# Patient Record
Sex: FEMALE | Race: BLACK OR AFRICAN AMERICAN | Employment: UNEMPLOYED | URBAN - METROPOLITAN AREA
[De-identification: names, ages, dates, MRNs, and addresses within clinical notes are randomized per-mention and may not be internally consistent; named-entity substitution may affect disease eponyms.]

---
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
## 2019-04-10 ENCOUNTER — HOSPITAL ENCOUNTER (EMERGENCY)
Age: 19
Discharge: HOME | End: 2019-04-10
Attending: EMERGENCY MEDICINE
Payer: COMMERCIAL

## 2019-04-10 VITALS
TEMPERATURE: 98.2 F | WEIGHT: 143 LBS | HEIGHT: 58 IN | HEART RATE: 68 BPM | SYSTOLIC BLOOD PRESSURE: 113 MMHG | OXYGEN SATURATION: 99 % | RESPIRATION RATE: 16 BRPM | BODY MASS INDEX: 30.02 KG/M2 | DIASTOLIC BLOOD PRESSURE: 92 MMHG

## 2019-04-10 DIAGNOSIS — Z48.89 ENCOUNTER FOR POST SURGICAL WOUND CHECK: Primary | ICD-10-CM

## 2019-04-10 LAB
ABSOLUTE BANDS #: 0.26 K/UL (ref 0–1)
ABSOLUTE EOS #: 0.09 K/UL (ref 0–0.4)
ABSOLUTE IMMATURE GRANULOCYTE: ABNORMAL K/UL (ref 0–0.3)
ABSOLUTE LYMPH #: 1.62 K/UL (ref 1.2–5.2)
ABSOLUTE MONO #: 1.19 K/UL (ref 0.1–1.3)
ALBUMIN SERPL-MCNC: 3.5 G/DL (ref 3.5–5.2)
ALBUMIN/GLOBULIN RATIO: ABNORMAL (ref 1–2.5)
ALP BLD-CCNC: 187 U/L (ref 35–104)
ALT SERPL-CCNC: 23 U/L (ref 5–33)
ANION GAP SERPL CALCULATED.3IONS-SCNC: 12 MMOL/L (ref 9–17)
AST SERPL-CCNC: 25 U/L
BANDS: 3 % (ref 0–10)
BASOPHILS # BLD: 0 % (ref 0–2)
BASOPHILS ABSOLUTE: 0 K/UL (ref 0–0.2)
BILIRUB SERPL-MCNC: 0.27 MG/DL (ref 0.3–1.2)
BUN BLDV-MCNC: 8 MG/DL (ref 6–20)
BUN/CREAT BLD: ABNORMAL (ref 9–20)
CALCIUM SERPL-MCNC: 8.7 MG/DL (ref 8.6–10.4)
CHLORIDE BLD-SCNC: 106 MMOL/L (ref 98–107)
CO2: 22 MMOL/L (ref 20–31)
CREAT SERPL-MCNC: 0.59 MG/DL (ref 0.5–0.9)
DIFFERENTIAL TYPE: ABNORMAL
EOSINOPHILS RELATIVE PERCENT: 1 % (ref 0–4)
GFR AFRICAN AMERICAN: >60 ML/MIN
GFR NON-AFRICAN AMERICAN: >60 ML/MIN
GFR SERPL CREATININE-BSD FRML MDRD: ABNORMAL ML/MIN/{1.73_M2}
GFR SERPL CREATININE-BSD FRML MDRD: ABNORMAL ML/MIN/{1.73_M2}
GLUCOSE BLD-MCNC: 88 MG/DL (ref 70–99)
HCT VFR BLD CALC: 27.8 % (ref 36–46)
HEMOGLOBIN: 9.1 G/DL (ref 12–16)
IMMATURE GRANULOCYTES: ABNORMAL %
INR BLD: 1.1
LYMPHOCYTES # BLD: 19 % (ref 25–45)
MCH RBC QN AUTO: 29.3 PG (ref 26–34)
MCHC RBC AUTO-ENTMCNC: 32.7 G/DL (ref 31–37)
MCV RBC AUTO: 89.4 FL (ref 80–100)
MONOCYTES # BLD: 14 % (ref 2–8)
MORPHOLOGY: ABNORMAL
MORPHOLOGY: ABNORMAL
NRBC AUTOMATED: ABNORMAL PER 100 WBC
PDW BLD-RTO: 14.6 % (ref 11.5–14.9)
PLATELET # BLD: 239 K/UL (ref 150–450)
PLATELET ESTIMATE: ABNORMAL
PMV BLD AUTO: 8.2 FL (ref 6–12)
POTASSIUM SERPL-SCNC: 4 MMOL/L (ref 3.7–5.3)
PROTHROMBIN TIME: 13.8 SEC (ref 11.8–14.6)
RBC # BLD: 3.11 M/UL (ref 4–5.2)
RBC # BLD: ABNORMAL 10*6/UL
SEG NEUTROPHILS: 63 % (ref 34–64)
SEGMENTED NEUTROPHILS ABSOLUTE COUNT: 5.34 K/UL (ref 1.3–9.1)
SODIUM BLD-SCNC: 140 MMOL/L (ref 135–144)
TOTAL PROTEIN: 7 G/DL (ref 6.4–8.3)
WBC # BLD: 8.5 K/UL (ref 4.5–13.5)
WBC # BLD: ABNORMAL 10*3/UL

## 2019-04-10 PROCEDURE — 99283 EMERGENCY DEPT VISIT LOW MDM: CPT

## 2019-04-10 PROCEDURE — 85610 PROTHROMBIN TIME: CPT

## 2019-04-10 PROCEDURE — 85025 COMPLETE CBC W/AUTO DIFF WBC: CPT

## 2019-04-10 PROCEDURE — 36415 COLL VENOUS BLD VENIPUNCTURE: CPT

## 2019-04-10 PROCEDURE — 80053 COMPREHEN METABOLIC PANEL: CPT

## 2019-04-10 RX ORDER — IBUPROFEN 800 MG/1
800 TABLET ORAL EVERY 6 HOURS PRN
COMMUNITY

## 2019-04-10 RX ORDER — HYDROCODONE BITARTRATE AND ACETAMINOPHEN 5; 325 MG/1; MG/1
1 TABLET ORAL EVERY 6 HOURS PRN
Status: ON HOLD | COMMUNITY
End: 2019-04-15 | Stop reason: HOSPADM

## 2019-04-10 ASSESSMENT — ENCOUNTER SYMPTOMS
NAUSEA: 0
SHORTNESS OF BREATH: 0
ABDOMINAL PAIN: 1

## 2019-04-10 ASSESSMENT — PAIN DESCRIPTION - DESCRIPTORS: DESCRIPTORS: ACHING

## 2019-04-10 ASSESSMENT — PAIN SCALES - GENERAL: PAINLEVEL_OUTOF10: 7

## 2019-04-10 ASSESSMENT — PAIN DESCRIPTION - ORIENTATION: ORIENTATION: LOWER

## 2019-04-10 ASSESSMENT — PAIN DESCRIPTION - FREQUENCY: FREQUENCY: CONTINUOUS

## 2019-04-10 ASSESSMENT — PAIN DESCRIPTION - LOCATION: LOCATION: ABDOMEN

## 2019-04-10 NOTE — ED NOTES

## 2019-04-10 NOTE — ED PROVIDER NOTES
[address]  eMERGENCY dEPARTMENT eNCOUnter      Pt Name: Sherwin Cassidy  MRN: [id number]  [place] 2000  Date of evaluation: 4/10/19      CHIEF COMPLAINT       Chief Complaint   Patient presents with    Wound Check     HISTORY OF PRESENT ILLNESS   HPI 20 y.o. female presents with c/o of bleeding from  wound. The patient was , who underwent a cs on 4/3/19 at [facility] in [facility] for failure to progress. Her postoperative course has been unremarkable. She states that her pain is improving every day. It is throbbing/sharp, 7 out of 10 intensity, improves with ibuprofen and Norco.  She woke up this morning around 5am and noticed dried blood on her dressing  and had that she keeps over the surgical wound. No fevers or chills. No vaginal discharge. No lightheadedness or dizziness. REVIEW OF SYSTEMS     Review of Systems   Constitutional: Negative for chills and fever. HENT: Negative for congestion. Eyes: Negative for visual disturbance. Respiratory: Negative for shortness of breath. Cardiovascular: Negative for chest pain. Gastrointestinal: Positive for abdominal pain. Negative for nausea. Skin: Positive for wound. Neurological: Negative for dizziness and light-headedness. Hematological: Does not bruise/bleed easily. PAST MEDICAL HISTORY   None    SURGICAL HISTORY     Cs - 4/3/19    CURRENT MEDICATIONS       Previous Medications    DOCUSATE SODIUM (COLACE) 100 MG CAPSULE    Take 1 capsule by mouth daily    HYDROCODONE-ACETAMINOPHEN (NORCO) 5-325 MG PER TABLET    Take 1 tablet by mouth every 6 hours as needed for Pain. IBUPROFEN (ADVIL;MOTRIN) 800 MG TABLET    Take 800 mg by mouth every 6 hours as needed for Pain    PRENATAL VIT-FE FUMARATE-FA (PRENATAL 1+1 PO)    Take by mouth       ALLERGIES     has No Known Allergies. FAMILY HISTORY     MGM - Diabetes    SOCIAL HISTORY      reports that she has never smoked.  She has never used smokeless tobacco. She reports that she does not drink alcohol or use drugs. PHYSICAL EXAM     INITIAL VITALS: BP (!) 113/92   Pulse 68   Temp 98.2 °F (36.8 °C) (Oral)   Resp 16   Ht 4' 10\" (1.473 m)   Wt 143 lb (64.9 kg)   LMP  (LMP Unknown)   SpO2 99%   Breastfeeding? Unknown   BMI 29.89 kg/m²   Gen: NAD  Head: Normocephalic, atraumatic  Eye: Pupils equal round reactive to light, no conjunctivitis  Heart: Regular rate and rhythm no murmurs  Lungs: Clear to auscultation bilaterally, no respiratory distress  Chest wall: No crepitus, no tenderness palpation  Abdomen: Soft, nontender, nondistended, with no peritoneal signs  Skin: lower abdominal transverse surgical scar. No erythema. There is no wound dehiscence. There is dry blood on the Steri-Strips and some dry blood on the pad that she has over the wound. There is no expressible bleeding or discharge. Neurologic: Patient is alert and oriented x3, motor and sensation is intact in all 4 extremities, cerebellar function is normal  Extremities: No edema or rash   MEDICAL DECISION MAKING:     MDM  This is a 79-year-old who is one-week status post a  presenting for wound evaluation. There is no wound dehiscence. There is no active bleeding. There is no sign of infection. We'll check hemoglobin level. We'll check her platelets. We'll check her coags. And reassess. Hospital course:  Hemoglobin is at her baseline. Platelets are normal.  Renal function is normal.  Coags are normal.    There continues to be no bleeding from the surgical site. Blood pressure remains normal.  Heart rate remains normal.    I discussed with the patient about following up with the local OB or returning to her OBs in [facility]. She says that she is going to return to [facility] where she has more family help taking care of the baby and she does not want to follow with an OB in the [place] area.     D/w pt results, treatment plan, warning precautions for prompt ED return and importance

## 2019-04-14 ENCOUNTER — APPOINTMENT (OUTPATIENT)
Dept: CT IMAGING | Age: 19
DRG: 561 | End: 2019-04-14
Payer: COMMERCIAL

## 2019-04-14 ENCOUNTER — HOSPITAL ENCOUNTER (INPATIENT)
Age: 19
LOS: 1 days | Discharge: HOME | DRG: 561 | End: 2019-04-15
Attending: EMERGENCY MEDICINE | Admitting: OBSTETRICS & GYNECOLOGY
Payer: COMMERCIAL

## 2019-04-14 ENCOUNTER — APPOINTMENT (OUTPATIENT)
Dept: GENERAL RADIOLOGY | Age: 19
DRG: 561 | End: 2019-04-14
Payer: COMMERCIAL

## 2019-04-14 DIAGNOSIS — A59.9 TRICHOMONAS INFECTION: ICD-10-CM

## 2019-04-14 DIAGNOSIS — N71.9 ENDOMETRITIS: ICD-10-CM

## 2019-04-14 DIAGNOSIS — A41.9 SEPTICEMIA (HCC): Primary | ICD-10-CM

## 2019-04-14 PROBLEM — E87.20 LACTIC ACIDOSIS: Status: ACTIVE | Noted: 2019-04-14

## 2019-04-14 PROBLEM — D72.829 LEUKOCYTOSIS: Status: ACTIVE | Noted: 2019-04-14

## 2019-04-14 PROBLEM — Z98.891 S/P CESAREAN SECTION: Status: ACTIVE | Noted: 2019-04-14

## 2019-04-14 PROBLEM — N61.0 MASTITIS: Status: ACTIVE | Noted: 2019-04-14

## 2019-04-14 PROBLEM — R50.82 POSTOPERATIVE FEVER: Status: ACTIVE | Noted: 2019-04-14

## 2019-04-14 LAB
-: ABNORMAL
ABSOLUTE EOS #: 0 K/UL (ref 0–0.4)
ABSOLUTE IMMATURE GRANULOCYTE: ABNORMAL K/UL (ref 0–0.3)
ABSOLUTE LYMPH #: 0.76 K/UL (ref 1.2–5.2)
ABSOLUTE MONO #: 0.61 K/UL (ref 0.1–1.3)
ALBUMIN SERPL-MCNC: 3.8 G/DL (ref 3.5–5.2)
ALBUMIN/GLOBULIN RATIO: ABNORMAL (ref 1–2.5)
ALP BLD-CCNC: 161 U/L (ref 35–104)
ALT SERPL-CCNC: 21 U/L (ref 5–33)
AMORPHOUS: ABNORMAL
AMPHETAMINE SCREEN URINE: NEGATIVE
ANION GAP SERPL CALCULATED.3IONS-SCNC: 14 MMOL/L (ref 9–17)
AST SERPL-CCNC: 20 U/L
BACTERIA: ABNORMAL
BARBITURATE SCREEN URINE: NEGATIVE
BASOPHILS # BLD: 0 % (ref 0–2)
BASOPHILS ABSOLUTE: 0 K/UL (ref 0–0.2)
BENZODIAZEPINE SCREEN, URINE: NEGATIVE
BILIRUB SERPL-MCNC: 0.31 MG/DL (ref 0.3–1.2)
BILIRUBIN URINE: NEGATIVE
BUN BLDV-MCNC: 15 MG/DL (ref 6–20)
BUN/CREAT BLD: ABNORMAL (ref 9–20)
BUPRENORPHINE URINE: NORMAL
CALCIUM SERPL-MCNC: 8.7 MG/DL (ref 8.6–10.4)
CANNABINOID SCREEN URINE: NEGATIVE
CASTS UA: ABNORMAL /LPF
CHLORIDE BLD-SCNC: 102 MMOL/L (ref 98–107)
CO2: 21 MMOL/L (ref 20–31)
COCAINE METABOLITE, URINE: NEGATIVE
COLOR: YELLOW
COMMENT UA: ABNORMAL
CREAT SERPL-MCNC: 0.65 MG/DL (ref 0.5–0.9)
CRYSTALS, UA: ABNORMAL /HPF
D-DIMER QUANTITATIVE: 1.21 MG/L FEU (ref 0–0.59)
DIFFERENTIAL TYPE: ABNORMAL
DIRECT EXAM: ABNORMAL
DIRECT EXAM: NORMAL
EOSINOPHILS RELATIVE PERCENT: 0 % (ref 0–4)
EPITHELIAL CELLS UA: ABNORMAL /HPF
GFR AFRICAN AMERICAN: >60 ML/MIN
GFR NON-AFRICAN AMERICAN: >60 ML/MIN
GFR SERPL CREATININE-BSD FRML MDRD: ABNORMAL ML/MIN/{1.73_M2}
GFR SERPL CREATININE-BSD FRML MDRD: ABNORMAL ML/MIN/{1.73_M2}
GLUCOSE BLD-MCNC: 106 MG/DL (ref 70–99)
GLUCOSE URINE: NEGATIVE
HCT VFR BLD CALC: 35.1 % (ref 36–46)
HEMOGLOBIN: 11.3 G/DL (ref 12–16)
IMMATURE GRANULOCYTES: ABNORMAL %
INR BLD: 1.1
KETONES, URINE: NEGATIVE
LACTIC ACID, SEPSIS WHOLE BLOOD: ABNORMAL MMOL/L (ref 0.5–1.9)
LACTIC ACID, SEPSIS WHOLE BLOOD: NORMAL MMOL/L (ref 0.5–1.9)
LACTIC ACID, SEPSIS: 1.5 MMOL/L (ref 0.5–1.9)
LACTIC ACID, SEPSIS: 2.2 MMOL/L (ref 0.5–1.9)
LEUKOCYTE ESTERASE, URINE: ABNORMAL
LYMPHOCYTES # BLD: 5 % (ref 25–45)
Lab: ABNORMAL
Lab: NORMAL
MCH RBC QN AUTO: 28.1 PG (ref 26–34)
MCHC RBC AUTO-ENTMCNC: 32.1 G/DL (ref 31–37)
MCV RBC AUTO: 87.7 FL (ref 80–100)
MDMA URINE: NORMAL
METHADONE SCREEN, URINE: NEGATIVE
METHAMPHETAMINE, URINE: NORMAL
MONOCYTES # BLD: 4 % (ref 2–8)
MORPHOLOGY: ABNORMAL
MUCUS: ABNORMAL
NITRITE, URINE: NEGATIVE
NRBC AUTOMATED: ABNORMAL PER 100 WBC
OPIATES, URINE: NEGATIVE
OTHER OBSERVATIONS UA: ABNORMAL
OXYCODONE SCREEN URINE: NEGATIVE
PARTIAL THROMBOPLASTIN TIME: 33.6 SEC (ref 24–36)
PDW BLD-RTO: 14.8 % (ref 11.5–14.9)
PH UA: 6 (ref 5–8)
PHENCYCLIDINE, URINE: NEGATIVE
PLATELET # BLD: 272 K/UL (ref 150–450)
PLATELET ESTIMATE: ABNORMAL
PMV BLD AUTO: 8.4 FL (ref 6–12)
POTASSIUM SERPL-SCNC: 4.1 MMOL/L (ref 3.7–5.3)
PROPOXYPHENE, URINE: NORMAL
PROTEIN UA: NEGATIVE
PROTHROMBIN TIME: 14.2 SEC (ref 11.8–14.6)
RBC # BLD: 4.01 M/UL (ref 4–5.2)
RBC # BLD: ABNORMAL 10*6/UL
RBC UA: ABNORMAL /HPF
RENAL EPITHELIAL, UA: ABNORMAL /HPF
SEG NEUTROPHILS: 91 % (ref 34–64)
SEGMENTED NEUTROPHILS ABSOLUTE COUNT: 13.83 K/UL (ref 1.3–9.1)
SODIUM BLD-SCNC: 137 MMOL/L (ref 135–144)
SPECIFIC GRAVITY UA: 1 (ref 1–1.03)
SPECIMEN DESCRIPTION: ABNORMAL
SPECIMEN DESCRIPTION: NORMAL
TEST INFORMATION: NORMAL
TOTAL PROTEIN: 7.8 G/DL (ref 6.4–8.3)
TRICHOMONAS: ABNORMAL
TRICYCLIC ANTIDEPRESSANTS, UR: NORMAL
TURBIDITY: ABNORMAL
URINE HGB: ABNORMAL
UROBILINOGEN, URINE: NORMAL
WBC # BLD: 15.2 K/UL (ref 4.5–13.5)
WBC # BLD: ABNORMAL 10*3/UL
WBC UA: ABNORMAL /HPF
YEAST: ABNORMAL

## 2019-04-14 PROCEDURE — 2580000003 HC RX 258: Performed by: STUDENT IN AN ORGANIZED HEALTH CARE EDUCATION/TRAINING PROGRAM

## 2019-04-14 PROCEDURE — 36415 COLL VENOUS BLD VENIPUNCTURE: CPT

## 2019-04-14 PROCEDURE — 83605 ASSAY OF LACTIC ACID: CPT

## 2019-04-14 PROCEDURE — 80307 DRUG TEST PRSMV CHEM ANLYZR: CPT

## 2019-04-14 PROCEDURE — 1200000000 HC SEMI PRIVATE

## 2019-04-14 PROCEDURE — 6360000002 HC RX W HCPCS: Performed by: STUDENT IN AN ORGANIZED HEALTH CARE EDUCATION/TRAINING PROGRAM

## 2019-04-14 PROCEDURE — 85610 PROTHROMBIN TIME: CPT

## 2019-04-14 PROCEDURE — 80053 COMPREHEN METABOLIC PANEL: CPT

## 2019-04-14 PROCEDURE — 99285 EMERGENCY DEPT VISIT HI MDM: CPT

## 2019-04-14 PROCEDURE — 6370000000 HC RX 637 (ALT 250 FOR IP): Performed by: STUDENT IN AN ORGANIZED HEALTH CARE EDUCATION/TRAINING PROGRAM

## 2019-04-14 PROCEDURE — 71046 X-RAY EXAM CHEST 2 VIEWS: CPT

## 2019-04-14 PROCEDURE — 87040 BLOOD CULTURE FOR BACTERIA: CPT

## 2019-04-14 PROCEDURE — 96365 THER/PROPH/DIAG IV INF INIT: CPT

## 2019-04-14 PROCEDURE — 6360000004 HC RX CONTRAST MEDICATION: Performed by: STUDENT IN AN ORGANIZED HEALTH CARE EDUCATION/TRAINING PROGRAM

## 2019-04-14 PROCEDURE — 2580000003 HC RX 258: Performed by: OBSTETRICS & GYNECOLOGY

## 2019-04-14 PROCEDURE — 81001 URINALYSIS AUTO W/SCOPE: CPT

## 2019-04-14 PROCEDURE — 87491 CHLMYD TRACH DNA AMP PROBE: CPT

## 2019-04-14 PROCEDURE — 87480 CANDIDA DNA DIR PROBE: CPT

## 2019-04-14 PROCEDURE — 96375 TX/PRO/DX INJ NEW DRUG ADDON: CPT

## 2019-04-14 PROCEDURE — 85025 COMPLETE CBC W/AUTO DIFF WBC: CPT

## 2019-04-14 PROCEDURE — 96366 THER/PROPH/DIAG IV INF ADDON: CPT

## 2019-04-14 PROCEDURE — 96367 TX/PROPH/DG ADDL SEQ IV INF: CPT

## 2019-04-14 PROCEDURE — 87086 URINE CULTURE/COLONY COUNT: CPT

## 2019-04-14 PROCEDURE — 87804 INFLUENZA ASSAY W/OPTIC: CPT

## 2019-04-14 PROCEDURE — 87510 GARDNER VAG DNA DIR PROBE: CPT

## 2019-04-14 PROCEDURE — 74177 CT ABD & PELVIS W/CONTRAST: CPT

## 2019-04-14 PROCEDURE — 85730 THROMBOPLASTIN TIME PARTIAL: CPT

## 2019-04-14 PROCEDURE — 85379 FIBRIN DEGRADATION QUANT: CPT

## 2019-04-14 PROCEDURE — 71260 CT THORAX DX C+: CPT

## 2019-04-14 PROCEDURE — 87591 N.GONORRHOEAE DNA AMP PROB: CPT

## 2019-04-14 PROCEDURE — 87660 TRICHOMONAS VAGIN DIR PROBE: CPT

## 2019-04-14 RX ORDER — ACETAMINOPHEN 325 MG/1
650 TABLET ORAL EVERY 4 HOURS PRN
Status: DISCONTINUED | OUTPATIENT
Start: 2019-04-14 | End: 2019-04-14

## 2019-04-14 RX ORDER — SENNA AND DOCUSATE SODIUM 50; 8.6 MG/1; MG/1
1 TABLET, FILM COATED ORAL 2 TIMES DAILY
Status: DISCONTINUED | OUTPATIENT
Start: 2019-04-15 | End: 2019-04-15 | Stop reason: HOSPADM

## 2019-04-14 RX ORDER — 0.9 % SODIUM CHLORIDE 0.9 %
80 INTRAVENOUS SOLUTION INTRAVENOUS ONCE
Status: COMPLETED | OUTPATIENT
Start: 2019-04-14 | End: 2019-04-14

## 2019-04-14 RX ORDER — ONDANSETRON 2 MG/ML
4 INJECTION INTRAMUSCULAR; INTRAVENOUS ONCE
Status: COMPLETED | OUTPATIENT
Start: 2019-04-14 | End: 2019-04-14

## 2019-04-14 RX ORDER — SIMETHICONE 80 MG
80 TABLET,CHEWABLE ORAL EVERY 6 HOURS PRN
Status: DISCONTINUED | OUTPATIENT
Start: 2019-04-14 | End: 2019-04-15 | Stop reason: HOSPADM

## 2019-04-14 RX ORDER — IBUPROFEN 800 MG/1
800 TABLET ORAL EVERY 8 HOURS PRN
Status: DISCONTINUED | OUTPATIENT
Start: 2019-04-14 | End: 2019-04-15 | Stop reason: HOSPADM

## 2019-04-14 RX ORDER — ONDANSETRON 2 MG/ML
4 INJECTION INTRAMUSCULAR; INTRAVENOUS EVERY 6 HOURS PRN
Status: DISCONTINUED | OUTPATIENT
Start: 2019-04-14 | End: 2019-04-15 | Stop reason: HOSPADM

## 2019-04-14 RX ORDER — SODIUM CHLORIDE 0.9 % (FLUSH) 0.9 %
10 SYRINGE (ML) INJECTION PRN
Status: DISCONTINUED | OUTPATIENT
Start: 2019-04-14 | End: 2019-04-15 | Stop reason: HOSPADM

## 2019-04-14 RX ORDER — 0.9 % SODIUM CHLORIDE 0.9 %
30 INTRAVENOUS SOLUTION INTRAVENOUS ONCE
Status: COMPLETED | OUTPATIENT
Start: 2019-04-14 | End: 2019-04-14

## 2019-04-14 RX ORDER — SODIUM CHLORIDE 0.9 % (FLUSH) 0.9 %
10 SYRINGE (ML) INJECTION PRN
Status: DISCONTINUED | OUTPATIENT
Start: 2019-04-14 | End: 2019-04-14

## 2019-04-14 RX ORDER — SODIUM CHLORIDE 0.9 % (FLUSH) 0.9 %
10 SYRINGE (ML) INJECTION EVERY 12 HOURS SCHEDULED
Status: DISCONTINUED | OUTPATIENT
Start: 2019-04-14 | End: 2019-04-14

## 2019-04-14 RX ORDER — ONDANSETRON 2 MG/ML
4 INJECTION INTRAMUSCULAR; INTRAVENOUS EVERY 8 HOURS PRN
Status: DISCONTINUED | OUTPATIENT
Start: 2019-04-14 | End: 2019-04-14

## 2019-04-14 RX ORDER — SODIUM CHLORIDE 0.9 % (FLUSH) 0.9 %
10 SYRINGE (ML) INJECTION EVERY 12 HOURS SCHEDULED
Status: DISCONTINUED | OUTPATIENT
Start: 2019-04-14 | End: 2019-04-15 | Stop reason: HOSPADM

## 2019-04-14 RX ORDER — DIPHENHYDRAMINE HCL 25 MG
25 TABLET ORAL NIGHTLY PRN
Status: DISCONTINUED | OUTPATIENT
Start: 2019-04-15 | End: 2019-04-15 | Stop reason: HOSPADM

## 2019-04-14 RX ORDER — MORPHINE SULFATE 4 MG/ML
4 INJECTION, SOLUTION INTRAMUSCULAR; INTRAVENOUS
Status: DISCONTINUED | OUTPATIENT
Start: 2019-04-14 | End: 2019-04-14

## 2019-04-14 RX ORDER — ACETAMINOPHEN 325 MG/1
650 TABLET ORAL EVERY 4 HOURS PRN
Status: DISCONTINUED | OUTPATIENT
Start: 2019-04-14 | End: 2019-04-15 | Stop reason: HOSPADM

## 2019-04-14 RX ORDER — METRONIDAZOLE 500 MG/1
500 TABLET ORAL ONCE
Status: COMPLETED | OUTPATIENT
Start: 2019-04-14 | End: 2019-04-14

## 2019-04-14 RX ORDER — ACETAMINOPHEN 325 MG/1
650 TABLET ORAL ONCE
Status: COMPLETED | OUTPATIENT
Start: 2019-04-14 | End: 2019-04-14

## 2019-04-14 RX ORDER — METRONIDAZOLE 500 MG/1
1500 TABLET ORAL ONCE
Status: COMPLETED | OUTPATIENT
Start: 2019-04-14 | End: 2019-04-14

## 2019-04-14 RX ORDER — BISACODYL 10 MG
10 SUPPOSITORY, RECTAL RECTAL DAILY PRN
Status: DISCONTINUED | OUTPATIENT
Start: 2019-04-14 | End: 2019-04-15 | Stop reason: HOSPADM

## 2019-04-14 RX ORDER — SODIUM CHLORIDE, SODIUM LACTATE, POTASSIUM CHLORIDE, CALCIUM CHLORIDE 600; 310; 30; 20 MG/100ML; MG/100ML; MG/100ML; MG/100ML
INJECTION, SOLUTION INTRAVENOUS CONTINUOUS
Status: DISCONTINUED | OUTPATIENT
Start: 2019-04-14 | End: 2019-04-15 | Stop reason: HOSPADM

## 2019-04-14 RX ORDER — CALCIUM CARBONATE 200(500)MG
1000 TABLET,CHEWABLE ORAL 3 TIMES DAILY PRN
Status: DISCONTINUED | OUTPATIENT
Start: 2019-04-14 | End: 2019-04-15 | Stop reason: HOSPADM

## 2019-04-14 RX ORDER — IBUPROFEN 200 MG
400 TABLET ORAL ONCE
Status: COMPLETED | OUTPATIENT
Start: 2019-04-14 | End: 2019-04-14

## 2019-04-14 RX ORDER — MORPHINE SULFATE 4 MG/ML
2 INJECTION, SOLUTION INTRAMUSCULAR; INTRAVENOUS
Status: DISCONTINUED | OUTPATIENT
Start: 2019-04-14 | End: 2019-04-14

## 2019-04-14 RX ADMIN — PIPERACILLIN SODIUM,TAZOBACTAM SODIUM 3.38 G: 3; .375 INJECTION, POWDER, FOR SOLUTION INTRAVENOUS at 17:55

## 2019-04-14 RX ADMIN — ONDANSETRON 4 MG: 2 INJECTION INTRAMUSCULAR; INTRAVENOUS at 20:18

## 2019-04-14 RX ADMIN — VANCOMYCIN HYDROCHLORIDE 1000 MG: 1 INJECTION, POWDER, LYOPHILIZED, FOR SOLUTION INTRAVENOUS at 18:24

## 2019-04-14 RX ADMIN — Medication 10 ML: at 19:15

## 2019-04-14 RX ADMIN — METRONIDAZOLE 1500 MG: 500 TABLET ORAL at 20:18

## 2019-04-14 RX ADMIN — Medication 10 ML: at 23:45

## 2019-04-14 RX ADMIN — SODIUM CHLORIDE 80 ML: 9 INJECTION, SOLUTION INTRAVENOUS at 19:15

## 2019-04-14 RX ADMIN — IBUPROFEN 400 MG: 200 TABLET, FILM COATED ORAL at 18:26

## 2019-04-14 RX ADMIN — IOVERSOL 75 ML: 741 INJECTION INTRA-ARTERIAL; INTRAVENOUS at 19:15

## 2019-04-14 RX ADMIN — METRONIDAZOLE 500 MG: 500 TABLET ORAL at 22:30

## 2019-04-14 RX ADMIN — SODIUM CHLORIDE 1770 ML: 9 INJECTION, SOLUTION INTRAVENOUS at 16:04

## 2019-04-14 RX ADMIN — ACETAMINOPHEN 650 MG: 325 TABLET, FILM COATED ORAL at 16:32

## 2019-04-14 ASSESSMENT — ENCOUNTER SYMPTOMS
CONSTIPATION: 0
BACK PAIN: 1
VOICE CHANGE: 0
STRIDOR: 0
COUGH: 0
TROUBLE SWALLOWING: 0
ABDOMINAL DISTENTION: 0
ABDOMINAL PAIN: 0
WHEEZING: 0
SORE THROAT: 0
SHORTNESS OF BREATH: 0
EYE PAIN: 0
NAUSEA: 0
DIARRHEA: 0
VOMITING: 0

## 2019-04-14 ASSESSMENT — PAIN SCALES - GENERAL
PAINLEVEL_OUTOF10: 8
PAINLEVEL_OUTOF10: 5
PAINLEVEL_OUTOF10: 8

## 2019-04-14 ASSESSMENT — PAIN DESCRIPTION - PAIN TYPE: TYPE: ACUTE PAIN

## 2019-04-14 ASSESSMENT — PAIN DESCRIPTION - LOCATION: LOCATION: BACK;BUTTOCKS

## 2019-04-14 NOTE — ED PROVIDER NOTES
[address]  Emergency Department Encounter  EmergencyMedicine Resident     Pt Name:Alpa Telles  MRN: [id number]  [place] 2000  Date of evaluation: 19  PCP:  No primary care provider on file. CHIEF COMPLAINT       Chief Complaint   Patient presents with    Loss of Consciousness     Syncopal episode    Fever    Headache     Frontal headache    Back Pain    Fall       HISTORY OF PRESENT ILLNESS  (Location/Symptom, Timing/Onset, Context/Setting, Quality, Duration, Modifying Factors, Severity.)      Lore Dasilva is a 20 y.o. female who presents with EMS after syncopal episode. Patient states that she was in the shower became lightheaded sat on the toilet and had her sister called 911. Patient states that she attempted to get up to go to the bed and lie down. Next thing she knew she was sitting down and leaning against the bathroom door. Patient thinks that she fell onto her buttock is complaining of mild headache and low back pain. The patient is 11 days postop  . Patient is denying any chest pain shortness of breath nausea vomiting diarrhea. PAST MEDICAL / SURGICAL / SOCIAL / FAMILY HISTORY      has a past medical history of Post partum depression. has a past surgical history that includes  section (2019).     Social History     Socioeconomic History    Marital status: Single     Spouse name: Not on file    Number of children: Not on file    Years of education: Not on file    Highest education level: Not on file   Occupational History    Not on file   Social Needs    Financial resource strain: Not on file    Food insecurity:     Worry: Not on file     Inability: Not on file    Transportation needs:     Medical: Not on file     Non-medical: Not on file   Tobacco Use    Smoking status: Never Smoker    Smokeless tobacco: Never Used   Substance and Sexual Activity    Alcohol use: No    Drug use: No    Sexual activity: Never   Lifestyle  Physical activity:     Days per week: Not on file     Minutes per session: Not on file    Stress: Not on file   Relationships    Social connections:     Talks on phone: Not on file     Gets together: Not on file     Attends [employer] service: Not on file     Active member of club or organization: Not on file     Attends meetings of clubs or organizations: Not on file     Relationship status: Not on file    Intimate partner violence:     Fear of current or ex partner: Not on file     Emotionally abused: Not on file     Physically abused: Not on file     Forced sexual activity: Not on file   Other Topics Concern    Not on file   Social History Narrative    Not on file       Patient was advised to stop smoking or to avoid tobacco use    History reviewed. No pertinent family history. Allergies:  Patient has no known allergies. Home Medications:     Prior to Admission medications    Medication Sig Start Date End Date Taking? Authorizing Provider   ibuprofen (ADVIL;MOTRIN) 800 MG tablet Take 800 mg by mouth every 6 hours as needed for Pain   Yes Historical Provider, MD   Prenatal Vit-Fe Fumarate-FA (PRENATAL 1+1 PO) Take by mouth   Yes Historical Provider, MD   docusate sodium (COLACE) 100 MG capsule Take 1 capsule by mouth daily 10/19/18  Yes Mercy Milks, APRN - CNP   HYDROcodone-acetaminophen (NORCO) 5-325 MG per tablet Take 1 tablet by mouth every 6 hours as needed for Pain. Historical Provider, MD       REVIEW OF SYSTEMS    (2-9 systems for level 4, 10 or more for level 5)      Review of Systems   Constitutional: Negative for activity change, appetite change, chills, fatigue and fever. HENT: Negative for sore throat, trouble swallowing and voice change. Eyes: Negative for pain and visual disturbance. Respiratory: Negative for cough, shortness of breath, wheezing and stridor. Cardiovascular: Negative for chest pain and palpitations.    Gastrointestinal: Negative for abdominal distention, abdominal pain, constipation, diarrhea, nausea and vomiting. Genitourinary: Negative for dysuria, frequency and hematuria. Musculoskeletal: Positive for back pain. Negative for gait problem and joint swelling. Neurological: Positive for syncope and headaches. Negative for dizziness and speech difficulty. Psychiatric/Behavioral: Negative for suicidal ideas. PHYSICAL EXAM   (up to 7 for level 4, 8 or more for level 5)      INITIAL VITALS:  /67   Pulse 108   Temp 98.3 °F (36.8 °C) (Oral)   Resp 21   Ht 4' 10\" (1.473 m)   Wt 130 lb (59 kg)   LMP  (LMP Unknown)   SpO2 96%   BMI 27.17 kg/m²     Physical Exam   Constitutional: She is oriented to person, place, and time. She appears well-developed and well-nourished. No distress. HENT:   Head: Normocephalic and atraumatic. Eyes: Pupils are equal, round, and reactive to light. Conjunctivae and EOM are normal.   Neck: Normal range of motion. Neck supple. Cardiovascular: Regular rhythm, normal heart sounds and intact distal pulses. Tachycardic   Pulmonary/Chest: Effort normal and breath sounds normal. No stridor. No respiratory distress. She has no wheezes. Abdominal: Soft. Bowel sounds are normal. There is tenderness. There is no rebound. Incision site is clean without any drainage or erythema. Patient has mild bilateral lower quadrant tenderness to palpation   Musculoskeletal: Normal range of motion. She exhibits no edema. Neurological: She is alert and oriented to person, place, and time. She displays normal reflexes. No cranial nerve deficit or sensory deficit. She exhibits normal muscle tone. Coordination normal.   Skin: Skin is warm and dry. She is not diaphoretic. Psychiatric: She has a normal mood and affect. Thought content normal.   Nursing note and vitals reviewed.       DIFFERENTIAL  DIAGNOSIS     PLAN (LABS / IMAGING / EKG):  Orders Placed This Encounter   Procedures    Culture Blood #1    Culture Blood #1    Urine REPORTED     Direct Exam POSITIVE for Trichomonas vaginalis (A)     Direct Exam NEGATIVE for Candida sp. Direct Exam NEGATIVE for Gardnerella vaginalis     Direct Exam       Method of testing is a DNA probe intended for detection and identification of Candida species, Gardnerella vaginalis, and Trichomonas vaginalis nucleic acid in vaginal fluid specimens from patients with symptoms of vaginitis/vaginosis.    CBC Auto Differential   Result Value Ref Range    WBC 15.2 (H) 4.5 - 13.5 k/uL    RBC 4.01 4.0 - 5.2 m/uL    Hemoglobin 11.3 (L) 12.0 - 16.0 g/dL    Hematocrit 35.1 (L) 36 - 46 %    MCV 87.7 80 - 100 fL    MCH 28.1 26 - 34 pg    MCHC 32.1 31 - 37 g/dL    RDW 14.8 11.5 - 14.9 %    Platelets 155 032 - 150 k/uL    MPV 8.4 6.0 - 12.0 fL    NRBC Automated NOT REPORTED per 100 WBC    Differential Type NOT REPORTED     Immature Granulocytes NOT REPORTED 0 %    Absolute Immature Granulocyte NOT REPORTED 0.00 - 0.30 k/uL    WBC Morphology NOT REPORTED     RBC Morphology NOT REPORTED     Platelet Estimate NOT REPORTED     Seg Neutrophils 91 (H) 34 - 64 %    Lymphocytes 5 (L) 25 - 45 %    Monocytes 4 2 - 8 %    Eosinophils % 0 0 - 4 %    Basophils 0 0 - 2 %    Segs Absolute 13.83 (H) 1.3 - 9.1 k/uL    Absolute Lymph # 0.76 (L) 1.2 - 5.2 k/uL    Absolute Mono # 0.61 0.1 - 1.3 k/uL    Absolute Eos # 0.00 0.0 - 0.4 k/uL    Basophils # 0.00 0.0 - 0.2 k/uL    Morphology ANISOCYTOSIS PRESENT    Lactate, Sepsis   Result Value Ref Range    Lactic Acid, Sepsis 2.2 (H) 0.5 - 1.9 mmol/L    Lactic Acid, Sepsis, Whole Blood NOT REPORTED 0.5 - 1.9 mmol/L   Lactate, Sepsis   Result Value Ref Range    Lactic Acid, Sepsis 1.5 0.5 - 1.9 mmol/L    Lactic Acid, Sepsis, Whole Blood NOT REPORTED 0.5 - 1.9 mmol/L   Comprehensive Metabolic Panel   Result Value Ref Range    Glucose 106 (H) 70 - 99 mg/dL    BUN 15 6 - 20 mg/dL    CREATININE 0.65 0.50 - 0.90 mg/dL    Bun/Cre Ratio NOT REPORTED 9 - 20    Calcium 8.7 8.6 - 10.4 mg/dL    Sodium 137 135 - 144 mmol/L    Potassium 4.1 3.7 - 5.3 mmol/L    Chloride 102 98 - 107 mmol/L    CO2 21 20 - 31 mmol/L    Anion Gap 14 9 - 17 mmol/L    Alkaline Phosphatase 161 (H) 35 - 104 U/L    ALT 21 5 - 33 U/L    AST 20 <32 U/L    Total Bilirubin 0.31 0.3 - 1.2 mg/dL    Total Protein 7.8 6.4 - 8.3 g/dL    Alb 3.8 3.5 - 5.2 g/dL    Albumin/Globulin Ratio NOT REPORTED 1.0 - 2.5    GFR Non-African American >60 >60 mL/min    GFR African American >60 >60 mL/min    GFR Comment          GFR Staging NOT REPORTED    Protime-INR   Result Value Ref Range    Protime 14.2 11.8 - 14.6 sec    INR 1.1    APTT   Result Value Ref Range    PTT 33.6 24.0 - 36.0 sec   Urinalysis with Microscopic   Result Value Ref Range    Color, UA YELLOW YELLOW    Turbidity UA CLOUDY (A) CLEAR    Glucose, Ur NEGATIVE NEGATIVE    Bilirubin Urine NEGATIVE NEGATIVE    Ketones, Urine NEGATIVE NEGATIVE    Specific Gravity, UA 1.005 1.000 - 1.030    Urine Hgb LARGE (A) NEGATIVE    pH, UA 6.0 5.0 - 8.0    Protein, UA NEGATIVE NEGATIVE    Urobilinogen, Urine Normal Normal    Nitrite, Urine NEGATIVE NEGATIVE    Leukocyte Esterase, Urine LARGE (A) NEGATIVE    Urinalysis Comments NOT REPORTED     -          WBC, UA 5 TO 10 /HPF    RBC, UA 0 TO 2 /HPF    Casts UA NOT REPORTED /LPF    Crystals UA NOT REPORTED None /HPF    Epithelial Cells UA 5 TO 10 /HPF    Renal Epithelial, Urine NOT REPORTED 0 /HPF    Bacteria, UA FEW (A) None    Mucus, UA NOT REPORTED None    Trichomonas, UA FEW (A) None    Amorphous, UA 2+ (A) None    Other Observations UA NOT REPORTED NOT REQ. Yeast, UA NOT REPORTED None   D-Dimer, Quantitative   Result Value Ref Range    D-Dimer, Quant 1.21 (H) 0.00 - 0.59 mg/L FEU       IMPRESSION: Patient is a 74-year-old female who presents with syncopal episode and found to be febrile tachycardic and skipped neck. Septic workup as above give Tylenol IV fluids and reevaluate.     Sepsis Times and Checklist  Vital Signs: BP: 112/67  Pulse: 108  Resp: 21 Temp: 98.3 °F (36.8 °C) SpO2: 96 %  SIRS (>2)   Temp > 38.3C or < 36C   HR > 90   RR > 20   WBC > 12 or < 4 or >10% bands  SIRS (>2) and confirmed or suspected source of infection = Sepsis  Is Sepsis due to likely bacterial infection?: Yes    Sepsis Identified:  Date: 4-14-19  Time: 1714  Sepsis Orders:  ·  CBC: Yes  ·  CMP: Yes  ·  PT/PTT: Yes  ·  Blood Cultures x2: Yes  ·  Urinalysis and Urine Culture: Yes  ·  Lactate: Yes  ·  Broad Spectrum Antibiotics Given (within 3 hours of sepsis identification,  after blood cultures):  Yes    (If unable to obtain IV access for IV antibiotics within 3 hours of  identification of sepsis, IM antibiotics is acceptable.)  ·             If lactate >2.0 MUST repeat within 6 hours    IV Fluid Bolus:  Is lactate > 4.0:  No        Re-evaluated after bolus: VS BP: 131/77, HR: 99, RR: 20,  100% RA  heart: regular Tachy but improved from presentation, lungs CTAB, cap refill <2sec, skin exam wnl radial pulses palpable, femoral pulses palpable, distal pulses palpable    RADIOLOGY:  Xr Chest Standard (2 Vw)    Result Date: 4/14/2019  EXAMINATION: TWO VIEWS OF THE CHEST 4/14/2019 4:45 pm COMPARISON: None. HISTORY: ORDERING SYSTEM PROVIDED HISTORY: Syncope, fever TECHNOLOGIST PROVIDED HISTORY: Syncope, fever Ordering Physician Provided Reason for Exam: syncope, fever Acuity: Acute Type of Exam: Initial FINDINGS: Overlying cardiac monitoring electrodes are present. Heart size is normal. No vascular congestion, focal consolidation, effusion, or pneumothorax is noted. Osseous and mediastinal structures are age-appropriate. No acute cardiopulmonary disease. Ct Abdomen Pelvis W Iv Contrast    Result Date: 4/14/2019  EXAMINATION: CT OF THE ABDOMEN AND PELVIS WITH CONTRAST 4/14/2019 6:51 pm TECHNIQUE: CT of the abdomen and pelvis was performed with the administration of intravenous contrast. Multiplanar reformatted images are provided for review.  Dose modulation, iterative reconstruction, and/or weight based adjustment of the mA/kV was utilized to reduce the radiation dose to as low as reasonably achievable. COMPARISON: None. HISTORY: ORDERING SYSTEM PROVIDED HISTORY: Septic, recent , mild b/l LQ TTP TECHNOLOGIST PROVIDED HISTORY: IV Only Contrast Ordering Physician Provided Reason for Exam: Septic, recent , mild b/l LQ TTP . Acuity: Unknown Type of Exam: Unknown Relevant Medical/Surgical History: Surgical hx -  19. FINDINGS: Lower Chest:  Visualized portion of the lower chest demonstrates no acute abnormality. Organs: The liver, gallbladder, biliary system, pancreas, spleen, adrenal glands, and left kidney are normal.  The right kidney enhances normally. There is mild right hydronephrosis secondary to extrinsic compression of the distal right ureter from the enlarged postpartum uterus. GI/Bowel: The bowel is normal, including the appendix. Pelvis: Enlarged, heterogeneous postpartum uterus. Urinary bladder unremarkable. No pelvic mass. Peritoneum/Retroperitoneum: No free air or free fluid. No focal fluid collection. Vascular structures unremarkable. No abnormal lymph node. Bones/Soft Tissues: No acute osseous abnormality. Changes of recent low transverse incision with non rim enhancing superficial subincisional fluid collection measuring 6.2 cm transverse by 1.3 cm AP x 2.3 cm craniocaudal.     1. Changes of recent  section with small seroma in the superficial subincisional soft tissues of the lower midline abdominal wall. 2. Mild right hydronephrosis secondary to extrinsic compression from the enlarged postpartum uterus. Ct Chest Pulmonary Embolism W Contrast    Result Date: 2019  EXAMINATION: CTA OF THE CHEST 2019 6:51 pm TECHNIQUE: CTA of the chest was performed after the administration of intravenous contrast.  Multiplanar reformatted images are provided for review. MIP images are provided for review.  Dose modulation, iterative reconstruction, and/or weight based adjustment of the mA/kV was utilized to reduce the radiation dose to as low as reasonably achievable. COMPARISON: None. HISTORY: ORDERING SYSTEM PROVIDED HISTORY: SOB, syncope, elevated d-dimer recent  TECHNOLOGIST PROVIDED HISTORY: Ordering Physician Provided Reason for Exam: SOB, syncope, elevated d-dimer recent  (19). Patient c/o pain to right arm pit (lymph nodes). Acuity: Unknown Type of Exam: Unknown Relevant Medical/Surgical History: No relevant surgical hx to area. FINDINGS: Pulmonary Arteries: Pulmonary arteries are adequately opacified for evaluation. No evidence of intraluminal filling defect to suggest pulmonary embolism. Main pulmonary artery is normal in caliber. Mediastinum: No evidence of mediastinal lymphadenopathy. The heart and pericardium demonstrate no acute abnormality. There is no acute abnormality of the thoracic aorta. Lungs/pleura: The lungs are without acute process. No focal consolidation or pulmonary edema. No evidence of pleural effusion or pneumothorax. Upper Abdomen: Limited images of the upper abdomen are unremarkable. Soft Tissues/Bones: No acute bone or soft tissue abnormality. No evidence of pulmonary embolism or acute pulmonary abnormality. EKG  None    All EKG's are interpreted by the Emergency Department Physician who either signs or Co-signsthis chart in the absence of a cardiologist.    EMERGENCY DEPARTMENT COURSE:  ED Course as of 2149   Sun 2019   1641 WBC(!): 15.2 [BL]   1641 Lactic Acid, Sepsis(!): 2.2 [BL]   1712 D-Dimer, Quant(!): 1.21 [BL]   1733 Patient reevaluated resting comfortably in bed. We'll perform pelvic exam.  Patient's heart rate has improved.   Elevated d-dimer likely secondary to recent surgery however is urinalysis and pelvic do not yield signs for infection source will likely perform CT abdomen pelvis as well as CT chest PE    [BL]   2019 Lactic acid is improved 1.5.  Patient reevaluated resting completely but no acute distress. Patient's heart rate has improved to the 80s to 90s. Patient's urine was positive for trichomonas given Flagyl. We will admit patient to hospital for IV antibiotics and monitoring. Patient was agreeable to plan    [BL]   2147 OB came down and evaluated the patient perform pelvic exam and patient's likely source of fever is endometritis accepted the admission bridging orders placed. [BL]      ED Course User Index  [BL] Luci Back DO        PROCEDURES:  None    CONSULTS:  PHARMACY TO DOSE VANCOMYCIN  IP CONSULT TO OB GYN      FINAL IMPRESSION      1. Septicemia ([facility] [address] [address]    2. Trichomonas infection          DISPOSITION / PLAN     DISPOSITION admitted     PATIENT REFERRED TO:  No follow-up provider specified.     DISCHARGE MEDICATIONS:  New Prescriptions    No medications on file       Luci Back DO  Emergency Medicine Resident    (Please note thatportions of this note were completed with a voice recognition program.  Efforts were made to edit the dictations but occasionally words are mis-transcribed.)       Luci Back DO  Resident  04/14/19 0494

## 2019-04-14 NOTE — ED NOTES
Pt sitting in bed on the phone with family. No sign of distress noted at this time.       Suman Wilder RN  04/14/19 1949

## 2019-04-14 NOTE — ED NOTES
1st set of blood cultures drawn from lt forearm IV start by this RN.      Lillian Winn RN  04/14/19 5637

## 2019-04-14 NOTE — ED NOTES
Writer present at bedside with Dr. Vikki Alvarez for pelvic exam. Patient tolerated well     Aicha Schultz RN  04/14/19 4171

## 2019-04-14 NOTE — PROGRESS NOTES
Pharmacy Note  Vancomycin Consult    Mildred Zapien is a 20 y.o. female started on Vancomycin for Urosepsis; consult received from Dr. Claudette Orellana to manage therapy. Also receiving the following antibiotics: Zosyn. There is no problem list on file for this patient. Allergies:  Patient has no known allergies. Temp max: 100.8    Recent Labs     04/14/19  1604   BUN 15       Recent Labs     04/14/19  1604   CREATININE 0.65       Recent Labs     04/14/19  1604   WBC 15.2*         Intake/Output Summary (Last 24 hours) at 4/14/2019 1739  Last data filed at 4/14/2019 1650  Gross per 24 hour   Intake 1770 ml   Output --   Net 1770 ml       Culture Date      Source                       Results  4/14                    Blood x 2                     Pending  4/14                    Urine                            Pending  4/14                     Nasal                          Presume neg Influenza A and B    Ht Readings from Last 1 Encounters:   04/14/19 4' 10\" (1.473 m)        Wt Readings from Last 1 Encounters:   04/14/19 130 lb (59 kg)         Body mass index is 27.17 kg/m². CrCl cannot be calculated (Unknown ideal weight.). Goal Trough Level: 10-20 mcg/mL    Assessment/Plan:  Will initiate vancomycin 1000 mg IV every 12 hours. Timing of trough level will be determined based on culture results, renal function, and clinical response. Thank you for the consult. Will continue to follow.    Lupe Beasley MS   4/14/2019  5:41 PM

## 2019-04-14 NOTE — ED NOTES
2nd set of blood cultures drawn from lt wrist straight stick by this RN.      Valerie Holcomb, HERB  04/14/19 0667

## 2019-04-15 VITALS
BODY MASS INDEX: 25.5 KG/M2 | RESPIRATION RATE: 12 BRPM | SYSTOLIC BLOOD PRESSURE: 133 MMHG | DIASTOLIC BLOOD PRESSURE: 73 MMHG | HEIGHT: 58 IN | WEIGHT: 121.47 LBS | HEART RATE: 76 BPM | TEMPERATURE: 98.4 F | OXYGEN SATURATION: 99 %

## 2019-04-15 PROBLEM — A59.01 TRICHOMONAS VAGINITIS: Status: ACTIVE | Noted: 2019-04-15

## 2019-04-15 LAB
ABSOLUTE EOS #: 0.1 K/UL (ref 0–0.4)
ABSOLUTE IMMATURE GRANULOCYTE: ABNORMAL K/UL (ref 0–0.3)
ABSOLUTE LYMPH #: 1.2 K/UL (ref 1.2–5.2)
ABSOLUTE MONO #: 0.9 K/UL (ref 0.1–1.3)
ANION GAP SERPL CALCULATED.3IONS-SCNC: 9 MMOL/L (ref 9–17)
BASOPHILS # BLD: 0 % (ref 0–2)
BASOPHILS ABSOLUTE: 0.1 K/UL (ref 0–0.2)
BUN BLDV-MCNC: 10 MG/DL (ref 6–20)
BUN/CREAT BLD: ABNORMAL (ref 9–20)
CALCIUM SERPL-MCNC: 7.8 MG/DL (ref 8.6–10.4)
CHLORIDE BLD-SCNC: 112 MMOL/L (ref 98–107)
CO2: 20 MMOL/L (ref 20–31)
CREAT SERPL-MCNC: 0.56 MG/DL (ref 0.5–0.9)
CULTURE: NORMAL
DIFFERENTIAL TYPE: ABNORMAL
EOSINOPHILS RELATIVE PERCENT: 1 % (ref 0–4)
GFR AFRICAN AMERICAN: >60 ML/MIN
GFR NON-AFRICAN AMERICAN: >60 ML/MIN
GFR SERPL CREATININE-BSD FRML MDRD: ABNORMAL ML/MIN/{1.73_M2}
GFR SERPL CREATININE-BSD FRML MDRD: ABNORMAL ML/MIN/{1.73_M2}
GLUCOSE BLD-MCNC: 101 MG/DL (ref 70–99)
HCT VFR BLD CALC: 30.8 % (ref 36–46)
HEMOGLOBIN: 10 G/DL (ref 12–16)
IMMATURE GRANULOCYTES: ABNORMAL %
LYMPHOCYTES # BLD: 9 % (ref 25–45)
Lab: NORMAL
MCH RBC QN AUTO: 28.5 PG (ref 26–34)
MCHC RBC AUTO-ENTMCNC: 32.5 G/DL (ref 31–37)
MCV RBC AUTO: 87.5 FL (ref 80–100)
MONOCYTES # BLD: 7 % (ref 2–8)
NRBC AUTOMATED: ABNORMAL PER 100 WBC
PDW BLD-RTO: 14.9 % (ref 11.5–14.9)
PLATELET # BLD: 243 K/UL (ref 150–450)
PLATELET ESTIMATE: ABNORMAL
PMV BLD AUTO: 8.5 FL (ref 6–12)
POTASSIUM SERPL-SCNC: 4.1 MMOL/L (ref 3.7–5.3)
RBC # BLD: 3.52 M/UL (ref 4–5.2)
RBC # BLD: ABNORMAL 10*6/UL
SEG NEUTROPHILS: 83 % (ref 34–64)
SEGMENTED NEUTROPHILS ABSOLUTE COUNT: 10.8 K/UL (ref 1.3–9.1)
SODIUM BLD-SCNC: 141 MMOL/L (ref 135–144)
SPECIMEN DESCRIPTION: NORMAL
WBC # BLD: 13 K/UL (ref 4.5–13.5)
WBC # BLD: ABNORMAL 10*3/UL

## 2019-04-15 PROCEDURE — 2580000003 HC RX 258: Performed by: OBSTETRICS & GYNECOLOGY

## 2019-04-15 PROCEDURE — 85025 COMPLETE CBC W/AUTO DIFF WBC: CPT

## 2019-04-15 PROCEDURE — 36415 COLL VENOUS BLD VENIPUNCTURE: CPT

## 2019-04-15 PROCEDURE — 6360000002 HC RX W HCPCS: Performed by: STUDENT IN AN ORGANIZED HEALTH CARE EDUCATION/TRAINING PROGRAM

## 2019-04-15 PROCEDURE — 99222 1ST HOSP IP/OBS MODERATE 55: CPT | Performed by: OBSTETRICS & GYNECOLOGY

## 2019-04-15 PROCEDURE — 80048 BASIC METABOLIC PNL TOTAL CA: CPT

## 2019-04-15 PROCEDURE — 6370000000 HC RX 637 (ALT 250 FOR IP): Performed by: STUDENT IN AN ORGANIZED HEALTH CARE EDUCATION/TRAINING PROGRAM

## 2019-04-15 PROCEDURE — 2580000003 HC RX 258: Performed by: STUDENT IN AN ORGANIZED HEALTH CARE EDUCATION/TRAINING PROGRAM

## 2019-04-15 PROCEDURE — 2500000003 HC RX 250 WO HCPCS: Performed by: STUDENT IN AN ORGANIZED HEALTH CARE EDUCATION/TRAINING PROGRAM

## 2019-04-15 RX ORDER — CLINDAMYCIN HYDROCHLORIDE 300 MG/1
300 CAPSULE ORAL 4 TIMES DAILY
Qty: 28 CAPSULE | Refills: 0 | Status: SHIPPED | OUTPATIENT
Start: 2019-04-15 | End: 2019-04-22

## 2019-04-15 RX ADMIN — SODIUM CHLORIDE, POTASSIUM CHLORIDE, SODIUM LACTATE AND CALCIUM CHLORIDE: 600; 310; 30; 20 INJECTION, SOLUTION INTRAVENOUS at 11:06

## 2019-04-15 RX ADMIN — SODIUM CHLORIDE, POTASSIUM CHLORIDE, SODIUM LACTATE AND CALCIUM CHLORIDE: 600; 310; 30; 20 INJECTION, SOLUTION INTRAVENOUS at 00:06

## 2019-04-15 RX ADMIN — AMPICILLIN SODIUM 2 G: 2 INJECTION, POWDER, FOR SOLUTION INTRAMUSCULAR; INTRAVENOUS at 13:05

## 2019-04-15 RX ADMIN — GENTAMICIN SULFATE 82.8 MG: 40 INJECTION, SOLUTION INTRAMUSCULAR; INTRAVENOUS at 00:45

## 2019-04-15 RX ADMIN — GENTAMICIN SULFATE 82.8 MG: 40 INJECTION, SOLUTION INTRAMUSCULAR; INTRAVENOUS at 15:52

## 2019-04-15 RX ADMIN — DEXTROSE MONOHYDRATE 900 MG: 50 INJECTION, SOLUTION INTRAVENOUS at 08:07

## 2019-04-15 RX ADMIN — AMPICILLIN SODIUM 2 G: 2 INJECTION, POWDER, FOR SOLUTION INTRAMUSCULAR; INTRAVENOUS at 18:41

## 2019-04-15 RX ADMIN — GENTAMICIN SULFATE 82.8 MG: 40 INJECTION, SOLUTION INTRAMUSCULAR; INTRAVENOUS at 08:52

## 2019-04-15 RX ADMIN — SENNOSIDES AND DOCUSATE SODIUM 1 TABLET: 8.6; 5 TABLET ORAL at 08:52

## 2019-04-15 RX ADMIN — ONDANSETRON 4 MG: 2 INJECTION INTRAMUSCULAR; INTRAVENOUS at 17:24

## 2019-04-15 RX ADMIN — AMPICILLIN SODIUM 2 G: 2 INJECTION, POWDER, FOR SOLUTION INTRAMUSCULAR; INTRAVENOUS at 01:19

## 2019-04-15 RX ADMIN — Medication 10 ML: at 08:53

## 2019-04-15 RX ADMIN — DEXTROSE MONOHYDRATE 900 MG: 50 INJECTION, SOLUTION INTRAVENOUS at 00:06

## 2019-04-15 RX ADMIN — DEXTROSE MONOHYDRATE 900 MG: 50 INJECTION, SOLUTION INTRAVENOUS at 15:17

## 2019-04-15 RX ADMIN — AMPICILLIN SODIUM 2 G: 2 INJECTION, POWDER, FOR SOLUTION INTRAMUSCULAR; INTRAVENOUS at 07:26

## 2019-04-15 ASSESSMENT — PAIN DESCRIPTION - LOCATION
LOCATION: BREAST
LOCATION: BREAST

## 2019-04-15 ASSESSMENT — PAIN SCALES - GENERAL
PAINLEVEL_OUTOF10: 5
PAINLEVEL_OUTOF10: 0
PAINLEVEL_OUTOF10: 4

## 2019-04-15 ASSESSMENT — PAIN DESCRIPTION - ORIENTATION
ORIENTATION: RIGHT
ORIENTATION: RIGHT

## 2019-04-15 ASSESSMENT — PAIN DESCRIPTION - DESCRIPTORS: DESCRIPTORS: TENDER

## 2019-04-15 ASSESSMENT — PAIN DESCRIPTION - PAIN TYPE: TYPE: ACUTE PAIN

## 2019-04-15 NOTE — PROGRESS NOTES
OB/GYN Progress Note:    Patient seen and examined. Right breast is far less inflamed and less warm to the touch. Patient denies chest pain, SOB, abdominal pain, N/V, fevers, chills. Will discharge patient to home. She will follow up in the resident clinic in 1-2 weeks. Patient counseled to take entire course of oral antibiotics. Patient counseled of s/s of infection and counseled to continue to breast feed. All questions answered and concerns addressed. Patient vocalized understanding. Vitals:    04/14/19 2249 04/15/19 0713 04/15/19 1332 04/15/19 1831   BP: (!) 112/56 116/63 114/63 120/81   Pulse: 82 64 96 76   Resp: 18 14 18 14   Temp: 97.9 °F (36.6 °C) 98.1 °F (36.7 °C) 98.1 °F (36.7 °C) 98.8 °F (37.1 °C)   TempSrc: Oral Oral Oral Oral   SpO2: 99% 99% 100% 97%   Weight: 121 lb 7.6 oz (55.1 kg)      Height: 4' 10\" (1.473 m)            Plan discussed with Dr. Karime Rea, who is agreeable.      Electronically signed by Sandie Cardozo DO on 4/15/2019 at 6:41 PM

## 2019-04-15 NOTE — H&P
OB/GYN H&P  [facility]     Patient Name: Arias Rm                              Patient : 2000  Room/Bed:   Admission Date/Time: 2019  3:50 PM  Primary Care Physician: No primary care provider on file.     Consulting Provider: Dr. Jazmín Calix    Reason for Consult: Postop fever     CC:        Chief Complaint   Patient presents with    Loss of Consciousness       Syncopal episode    Fever    Headache       Frontal headache    Back Pain    Fall                 HPI: Arias Rm is a 20 y.o. female  POD#11 s/p PLTCS due to failure to dilate presents after a syncopal episode at home after taking a shower. Event happened around  today. Patient reports feeling dizzy during a shower and left the shower and sat down. She states she \"blacked out\" for a short period of time. She did not hit her head or her abdomen. This was the first time this happened to her following her  section. Patient denies chest pain, SOB, headache, vision changes, dizziness, lightheadedness, significant abdominal pain, N/V, fevers, chills, dysuria, calf pain, cough or wheezing. She does admit to foul smelling lochia. Patient reports that her lochia is light. She is breast feeding. She is voiding and ambulating well. She had regular bowel movements. She denies s/s of post partum depression. Patient received all her medical care from Dr. Austyn Millan and delivered at [facility] in [facility] on 4/3/19. She denies any complication in her pregnancy. She denies having a fever during labor and denies taking antibiotics during this pregnancy. Patient denies ever having a sexually transmitted disease.  Denies personal or family history of VTE.     REVIEW OF SYSTEMS:   A minimum of an eleven point review of systems was completed.     Constitutional: negative fever, negative chills  HEENT: negative visual disturbances, negative headaches  Respiratory: negative dyspnea, negative cough  Cardiovascular: negative chest pain,  negative palpitations  Gastrointestinal: negative abdominal pain, negative RUQ pain, negative N/V, negative diarrhea, negative constipation  Genitourinary: negative dysuria, positive vaginal discharge, positive vaginal bleeding  Dermatological: negative rash, negative wounds  Hematologic: negative bleeding/clotting disorder  Immunologic: negative recent illness, negative recent sick contact, negative allergic reactions  Lymphatic: negative lymph nodes  Musculoskeletal: negative back pain, negative myalgias, negative arthralgias  Neurological:  negative dizziness, negative weakness  Behavior/Psych: negative depression, negative anxiety     _______________________________________________________________________        OBSTETRICAL HISTORY:                     OB History    Para Term  AB Living   1 1 1 0 0 1   SAB TAB Ectopic Molar Multiple Live Births    0 0 0 0 0 1       # Outcome Date GA Lbr Umang/2nd Weight Sex Delivery Anes PTL Lv   1 Term 19 41w4d   7 lb 8 oz (3.402 kg) F CS-LTranv     ZANDER      Complications: Failure to Progress in First Stage         PAST MEDICAL HISTORY:   has a past medical history of Post partum depression.     PAST SURGICAL HISTORY:   has a past surgical history that includes  section (2019).      ALLERGIES:      Allergies as of 2019    (No Known Allergies)         MEDICATIONS:  Current Facility-Administered Medications             Current Facility-Administered Medications   Medication Dose Route Frequency Provider Last Rate Last Dose    vancomycin 1000 mg IVPB in 250 mL D5W addavial  1,000 mg Intravenous Q12H Virlinda Alt,  mL/hr at 19 1824 1,000 mg at 19 1824    vancomycin (VANCOCIN) intermittent dosing (placeholder)   Other RX Placeholder Virlinda Alt, DO        sodium chloride flush 0.9 % injection 10 mL  10 mL Intravenous PRN Virlinda Alt, DO   10 mL at 19 1915             Current Outpatient Medications   Medication Sig Dispense Refill    ibuprofen (ADVIL;MOTRIN) 800 MG tablet Take 800 mg by mouth every 6 hours as needed for Pain        Prenatal Vit-Fe Fumarate-FA (PRENATAL 1+1 PO) Take by mouth        docusate sodium (COLACE) 100 MG capsule Take 1 capsule by mouth daily 14 capsule 0    HYDROcodone-acetaminophen (NORCO) 5-325 MG per tablet Take 1 tablet by mouth every 6 hours as needed for Pain.                FAMILY HISTORY:  family history is not on file.     SOCIAL HISTORY:   reports that she has never smoked. She has never used smokeless tobacco. She reports that she does not drink alcohol or use drugs.        ________________________________________________________________________                                    VITALS:  Vitals          Vitals:     04/14/19 1845 04/14/19 1930 04/14/19 1937 04/14/19 1948   BP: 121/67 122/67 122/67 112/67   Pulse: 91 78 94 108   Resp: 16 22 18 21   Temp:       98.3 °F (36.8 °C)   TempSrc:       Oral   SpO2: 98% 98% 100% 96%   Weight:           Height:                 Temp: 100.8, 101.1                                                                                                                                                                          PHYSICAL EXAM:     General Appearance: Appears healthy. Alert; in no acute distress. Pleasant. Respiratory: Normal expansion. Clear to auscultation. No rales, rhonchi, or wheezing.   Cardiovascular: regular rate and rhythm, no murmurs, rubs, or gallops  Breast: right breast erythematous, warm to touch, and tender, left breast normal, nipples everted, no masses  Abdomen: soft, non-tender, non-distended, no right upper quadrant tenderness and no CVA tenderness, pfannenstiel abdominal scar  Fundus: nontender, below umbilicus   Incision: clean, dry, intact without erythema   Pelvic Exam:   External genitalia: General appearance; normal, Hair distribution; normal, Lesions absent  Urinary system: urethral meatus normal  Vaginal: normal mucosa, scant blood, green discharge present without overt odor   Cervix: normal appearing cervix without discharge or lesions  Adnexa: normal adnexa in size, nontender and no masses  Uterus: normal single, nontender and anteverted  Musculoskeletal: no gross abnormalities  Extremities: non-tender BLE and non-edematous  Psych:  oriented to time, place and person, mood and affect are within normal limits      OMM EXAM:  The patient did not complain of a Chief complaint requiring OMM.   Chief Complaint:syncopal episode     Structural Exam: No Interest     LAB RESULTS:  Recent Results          Recent Results (from the past 8 hour(s))   CBC Auto Differential     Collection Time: 04/14/19  4:04 PM   Result Value Ref Range     WBC 15.2 (H) 4.5 - 13.5 k/uL     RBC 4.01 4.0 - 5.2 m/uL     Hemoglobin 11.3 (L) 12.0 - 16.0 g/dL     Hematocrit 35.1 (L) 36 - 46 %     MCV 87.7 80 - 100 fL     MCH 28.1 26 - 34 pg     MCHC 32.1 31 - 37 g/dL     RDW 14.8 11.5 - 14.9 %     Platelets 037 956 - 187 k/uL     MPV 8.4 6.0 - 12.0 fL     NRBC Automated NOT REPORTED per 100 WBC     Differential Type NOT REPORTED       Immature Granulocytes NOT REPORTED 0 %     Absolute Immature Granulocyte NOT REPORTED 0.00 - 0.30 k/uL     WBC Morphology NOT REPORTED       RBC Morphology NOT REPORTED       Platelet Estimate NOT REPORTED       Seg Neutrophils 91 (H) 34 - 64 %     Lymphocytes 5 (L) 25 - 45 %     Monocytes 4 2 - 8 %     Eosinophils % 0 0 - 4 %     Basophils 0 0 - 2 %     Segs Absolute 13.83 (H) 1.3 - 9.1 k/uL     Absolute Lymph # 0.76 (L) 1.2 - 5.2 k/uL     Absolute Mono # 0.61 0.1 - 1.3 k/uL     Absolute Eos # 0.00 0.0 - 0.4 k/uL     Basophils # 0.00 0.0 - 0.2 k/uL     Morphology ANISOCYTOSIS PRESENT     Lactate, Sepsis     Collection Time: 04/14/19  4:04 PM   Result Value Ref Range     Lactic Acid, Sepsis 2.2 (H) 0.5 - 1.9 mmol/L     Lactic Acid, Sepsis, Whole Blood NOT REPORTED 0.5 - 1.9 mmol/L   Comprehensive Metabolic Panel     Collection Time: 04/14/19  4:04 PM   Result Value Ref Range     Glucose 106 (H) 70 - 99 mg/dL     BUN 15 6 - 20 mg/dL     CREATININE 0.65 0.50 - 0.90 mg/dL     Bun/Cre Ratio NOT REPORTED 9 - 20     Calcium 8.7 8.6 - 10.4 mg/dL     Sodium 137 135 - 144 mmol/L     Potassium 4.1 3.7 - 5.3 mmol/L     Chloride 102 98 - 107 mmol/L     CO2 21 20 - 31 mmol/L     Anion Gap 14 9 - 17 mmol/L     Alkaline Phosphatase 161 (H) 35 - 104 U/L     ALT 21 5 - 33 U/L     AST 20 <32 U/L     Total Bilirubin 0.31 0.3 - 1.2 mg/dL     Total Protein 7.8 6.4 - 8.3 g/dL     Alb 3.8 3.5 - 5.2 g/dL     Albumin/Globulin Ratio NOT REPORTED 1.0 - 2.5     GFR Non-African American >60 >60 mL/min     GFR African American >60 >60 mL/min     GFR Comment            GFR Staging NOT REPORTED     Protime-INR     Collection Time: 04/14/19  4:04 PM   Result Value Ref Range     Protime 14.2 11.8 - 14.6 sec     INR 1.1     APTT     Collection Time: 04/14/19  4:04 PM   Result Value Ref Range     PTT 33.6 24.0 - 36.0 sec   D-Dimer, Quantitative     Collection Time: 04/14/19  4:04 PM   Result Value Ref Range     D-Dimer, Quant 1.21 (H) 0.00 - 0.59 mg/L FEU   Rapid influenza A/B antigens     Collection Time: 04/14/19  4:15 PM   Result Value Ref Range     Specimen Description . NARES       Special Requests NOT REPORTED       Direct Exam           PRESUMPTIVE NEGATIVE for Influenza A + B antigens. PCR testing to confirm this result is available upon request.  Specimen will be saved in the laboratory for 7 days. Please call [phone] if PCR testing is indicated. Lactate, Sepsis     Collection Time: 04/14/19  5:54 PM   Result Value Ref Range     Lactic Acid, Sepsis 1.5 0.5 - 1.9 mmol/L     Lactic Acid, Sepsis, Whole Blood NOT REPORTED 0.5 - 1.9 mmol/L   Vaginitis DNA Probe     Collection Time: 04/14/19  6:11 PM   Result Value Ref Range     Specimen Description . VAGINA       Special Requests NOT REPORTED       Direct Exam POSITIVE for Trichomonas vaginalis (A)       Direct Exam NEGATIVE for Candida sp.       Direct Exam NEGATIVE for Gardnerella vaginalis       Direct Exam           Method of testing is a DNA probe intended for detection and identification of Candida species, Gardnerella vaginalis, and Trichomonas vaginalis nucleic acid in vaginal fluid specimens from patients with symptoms of vaginitis/vaginosis. Urinalysis with Microscopic     Collection Time: 04/14/19  6:58 PM   Result Value Ref Range     Color, UA YELLOW YELLOW     Turbidity UA CLOUDY (A) CLEAR     Glucose, Ur NEGATIVE NEGATIVE     Bilirubin Urine NEGATIVE NEGATIVE     Ketones, Urine NEGATIVE NEGATIVE     Specific Gravity, UA 1.005 1.000 - 1.030     Urine Hgb LARGE (A) NEGATIVE     pH, UA 6.0 5.0 - 8.0     Protein, UA NEGATIVE NEGATIVE     Urobilinogen, Urine Normal Normal     Nitrite, Urine NEGATIVE NEGATIVE     Leukocyte Esterase, Urine LARGE (A) NEGATIVE     Urinalysis Comments NOT REPORTED       -            WBC, UA 5 TO 10 /HPF     RBC, UA 0 TO 2 /HPF     Casts UA NOT REPORTED /LPF     Crystals UA NOT REPORTED None /HPF     Epithelial Cells UA 5 TO 10 /HPF     Renal Epithelial, Urine NOT REPORTED 0 /HPF     Bacteria, UA FEW (A) None     Mucus, UA NOT REPORTED None     Trichomonas, UA FEW (A) None     Amorphous, UA 2+ (A) None     Other Observations UA NOT REPORTED NOT REQ.     Yeast, UA NOT REPORTED None            DIAGNOSTICS:  Xr Chest Standard (2 Vw)     Result Date: 4/14/2019  EXAMINATION: TWO VIEWS OF THE CHEST 4/14/2019 4:45 pm COMPARISON: None. HISTORY: ORDERING SYSTEM PROVIDED HISTORY: Syncope, fever TECHNOLOGIST PROVIDED HISTORY: Syncope, fever Ordering Physician Provided Reason for Exam: syncope, fever Acuity: Acute Type of Exam: Initial FINDINGS: Overlying cardiac monitoring electrodes are present. Heart size is normal. No vascular congestion, focal consolidation, effusion, or pneumothorax is noted.   Osseous and mediastinal structures are age-appropriate.      No acute cardiopulmonary disease.      Ct Abdomen Pelvis W Iv Contrast     Result Date: 2019  EXAMINATION: CT OF THE ABDOMEN AND PELVIS WITH CONTRAST 2019 6:51 pm TECHNIQUE: CT of the abdomen and pelvis was performed with the administration of intravenous contrast. Multiplanar reformatted images are provided for review. Dose modulation, iterative reconstruction, and/or weight based adjustment of the mA/kV was utilized to reduce the radiation dose to as low as reasonably achievable. COMPARISON: None. HISTORY: ORDERING SYSTEM PROVIDED HISTORY: Septic, recent , mild b/l LQ TTP TECHNOLOGIST PROVIDED HISTORY: IV Only Contrast Ordering Physician Provided Reason for Exam: Septic, recent , mild b/l LQ TTP . Acuity: Unknown Type of Exam: Unknown Relevant Medical/Surgical History: Surgical hx -  19. FINDINGS: Lower Chest:  Visualized portion of the lower chest demonstrates no acute abnormality. Organs: The liver, gallbladder, biliary system, pancreas, spleen, adrenal glands, and left kidney are normal.  The right kidney enhances normally. There is mild right hydronephrosis secondary to extrinsic compression of the distal right ureter from the enlarged postpartum uterus. GI/Bowel: The bowel is normal, including the appendix. Pelvis: Enlarged, heterogeneous postpartum uterus. Urinary bladder unremarkable. No pelvic mass. Peritoneum/Retroperitoneum: No free air or free fluid. No focal fluid collection. Vascular structures unremarkable. No abnormal lymph node. Bones/Soft Tissues: No acute osseous abnormality. Changes of recent low transverse incision with non rim enhancing superficial subincisional fluid collection measuring 6.2 cm transverse by 1.3 cm AP x 2.3 cm craniocaudal.      1. Changes of recent  section with small seroma in the superficial subincisional soft tissues of the lower midline abdominal wall.  2. Mild right hydronephrosis secondary to extrinsic compression from the enlarged postpartum uterus.      Ct Chest Pulmonary Embolism W Contrast     Result Date: 2019  EXAMINATION: CTA OF THE CHEST 2019 6:51 pm TECHNIQUE: CTA of the chest was performed after the administration of intravenous contrast.  Multiplanar reformatted images are provided for review. MIP images are provided for review. Dose modulation, iterative reconstruction, and/or weight based adjustment of the mA/kV was utilized to reduce the radiation dose to as low as reasonably achievable. COMPARISON: None. HISTORY: ORDERING SYSTEM PROVIDED HISTORY: SOB, syncope, elevated d-dimer recent  TECHNOLOGIST PROVIDED HISTORY: Ordering Physician Provided Reason for Exam: SOB, syncope, elevated d-dimer recent  (19). Patient c/o pain to right arm pit (lymph nodes). Acuity: Unknown Type of Exam: Unknown Relevant Medical/Surgical History: No relevant surgical hx to area. FINDINGS: Pulmonary Arteries: Pulmonary arteries are adequately opacified for evaluation. No evidence of intraluminal filling defect to suggest pulmonary embolism. Main pulmonary artery is normal in caliber. Mediastinum: No evidence of mediastinal lymphadenopathy. The heart and pericardium demonstrate no acute abnormality. There is no acute abnormality of the thoracic aorta. Lungs/pleura: The lungs are without acute process. No focal consolidation or pulmonary edema. No evidence of pleural effusion or pneumothorax. Upper Abdomen: Limited images of the upper abdomen are unremarkable. Soft Tissues/Bones: No acute bone or soft tissue abnormality.      No evidence of pulmonary embolism or acute pulmonary abnormality.         ASSESSMENT & PLAN:     Savita Rojas is a 20 y.o. female  POD#11 s/p PLTCS with post op fever  ATSO Dr. Ovidio Manriquez  Infection likely due to mastitis of right breast, but can not rule out endometritis and will treat for both.   CBC, BMP daily   IVF 125ml/hr LR  Ampicillin/Gentamycin/Clindamycin IV until afebrile x 24 hours  S/p Vanco, Zosyn in ED  General Diet  Motrin/Tylenol PRN pain  Activity: as tolerated with 15 lb weight restriction   DVT prophylaxis: SCDs  UCx pending  GC/C pending  Blood cultures pending  CT Abd/Pelvis: post surgical changes  CT Chest: No PE or acute pulmonary abnormalities   CXR: No cardiopulmonary abnormalities   Influenza swab negative      Mastitis of Right Breast  Continue breast feeding     Endometritis      Trichomonas  Patient given 1500mg Flagyl in ED. Will give 500mg more to treat for Trichomonas. Patient counseled that partner needs to be treated and to refrain from intercourse until he is.     Leukocytosis   WBC 15.2  Will trend     Lactic Acidosis (resolved)  Lactate 2.2>1. 5           Patient Active Problem List     Diagnosis Date Noted    Postoperative fever 04/14/2019       Priority: High    Mastitis 04/14/2019       Priority: High    Endometritis 04/14/2019       Priority: Medium    Leukocytosis 04/14/2019    Lactic acidosis (resolved) 04/14/2019    PLTCS 4/3/19 F (in [facility]) 04/14/2019         Plan discussed with Dr. Cong Benjamin, who is agreeable.      Attending's Name: Dr. Yoselyn Lopez, DO  Ob/Gyn Resident    4/14/2019, 8:59 PM

## 2019-04-15 NOTE — CONSULTS
[address]    Patient Name: Blessing Torres     Patient : 2000  Room/Bed:   Admission Date/Time: 2019  3:50 PM  Primary Care Physician: No primary care provider on file. Consulting Provider: Dr. Thanh Stringer  Reason for Consult: Postop fever    CC:   Chief Complaint   Patient presents with    Loss of Consciousness     Syncopal episode    Fever    Headache     Frontal headache    Back Pain    Fall                HPI: Blessing Torres is a 20 y.o. female  POD#11 s/p PLTCS due to failure to dilate presents after a syncopal episode at home after taking a shower. Event happened around  today. Patient reports feeling dizzy during a shower and left the shower and sat down. She states she \"blacked out\" for a short period of time. She did not hit her head or her abdomen. This was the first time this happened to her following her  section. Patient denies chest pain, SOB, headache, vision changes, dizziness, lightheadedness, significant abdominal pain, N/V, fevers, chills, dysuria, calf pain, cough or wheezing. She does admit to foul smelling lochia. Patient reports that her lochia is light. She is breast feeding. She is voiding and ambulating well. She had regular bowel movements. She denies s/s of post partum depression. Patient received all her medical care from Dr. Sukumar Narvaez and delivered at [facility] in [facility] on 4/3/19. She denies any complication in her pregnancy. She denies having a fever during labor and denies taking antibiotics during this pregnancy. Patient denies ever having a sexually transmitted disease. Denies personal or family history of VTE. REVIEW OF SYSTEMS:   A minimum of an eleven point review of systems was completed.     Constitutional: negative fever, negative chills  HEENT: negative visual disturbances, negative headaches  Respiratory: negative dyspnea, negative cough  Cardiovascular: negative chest pain,  negative palpitations  Gastrointestinal: negative abdominal pain, negative RUQ pain, negative N/V, negative diarrhea, negative constipation  Genitourinary: negative dysuria, positive vaginal discharge, positive vaginal bleeding  Dermatological: negative rash, negative wounds  Hematologic: negative bleeding/clotting disorder  Immunologic: negative recent illness, negative recent sick contact, negative allergic reactions  Lymphatic: negative lymph nodes  Musculoskeletal: negative back pain, negative myalgias, negative arthralgias  Neurological:  negative dizziness, negative weakness  Behavior/Psych: negative depression, negative anxiety    _______________________________________________________________________      OBSTETRICAL HISTORY:   OB History    Para Term  AB Living   1 1 1 0 0 1   SAB TAB Ectopic Molar Multiple Live Births   0 0 0 0 0 1      # Outcome Date GA Lbr Umang/2nd Weight Sex Delivery Anes PTL Lv   1 Term 19 41w4d  7 lb 8 oz (3.402 kg) F CS-LTranv   ZANDER      Complications: Failure to Progress in First Stage       PAST MEDICAL HISTORY:   has a past medical history of Post partum depression. PAST SURGICAL HISTORY:   has a past surgical history that includes  section (2019).     ALLERGIES:  Allergies as of 2019    (No Known Allergies)       MEDICATIONS:  Current Facility-Administered Medications   Medication Dose Route Frequency Provider Last Rate Last Dose    vancomycin 1000 mg IVPB in 250 mL D5W addavial  1,000 mg Intravenous Q12H Waynette Gitelman,  mL/hr at 19 1824 1,000 mg at 19 1824    vancomycin (VANCOCIN) intermittent dosing (placeholder)   Other RX Placeholder Waynette Gitelman, DO        sodium chloride flush 0.9 % injection 10 mL  10 mL Intravenous PRN Waynette Gitelman, DO   10 mL at 19 1915     Current Outpatient Medications   Medication Sig Dispense Refill    ibuprofen (ADVIL;MOTRIN) 800 MG tablet Take 800 mg by mouth every 6 hours as needed for Pain      Prenatal Vit-Fe Fumarate-FA (PRENATAL 1+1 PO) Take by mouth      docusate sodium (COLACE) 100 MG capsule Take 1 capsule by mouth daily 14 capsule 0    HYDROcodone-acetaminophen (NORCO) 5-325 MG per tablet Take 1 tablet by mouth every 6 hours as needed for Pain. FAMILY HISTORY:  family history is not on file. SOCIAL HISTORY:   reports that she has never smoked. She has never used smokeless tobacco. She reports that she does not drink alcohol or use drugs. ________________________________________________________________________                                    Jaron Boast:  Vitals:    04/14/19 1845 04/14/19 1930 04/14/19 1937 04/14/19 1948   BP: 121/67 122/67 122/67 112/67   Pulse: 91 78 94 108   Resp: 16 22 18 21   Temp:    98.3 °F (36.8 °C)   TempSrc:    Oral   SpO2: 98% 98% 100% 96%   Weight:       Height:         Temp: 100.8, 101.1                                                                                                                                                                          PHYSICAL EXAM:     General Appearance: Appears healthy. Alert; in no acute distress. Pleasant. Respiratory: Normal expansion. Clear to auscultation. No rales, rhonchi, or wheezing.   Cardiovascular: regular rate and rhythm, no murmurs, rubs, or gallops  Breast: right breast erythematous, warm to touch, and tender, left breast normal, nipples everted, no masses  Abdomen: soft, non-tender, non-distended, no right upper quadrant tenderness and no CVA tenderness, pfannenstiel abdominal scar  Fundus: nontender, below umbilicus   Incision: clean, dry, intact without erythema   Pelvic Exam:   External genitalia: General appearance; normal, Hair distribution; normal, Lesions absent  Urinary system: urethral meatus normal  Vaginal: normal mucosa, scant blood, green discharge present without overt odor   Cervix: normal appearing cervix without discharge or lesions  Adnexa: normal adnexa in size, nontender and no masses  Uterus: normal single, nontender and anteverted  Musculoskeletal: no gross abnormalities  Extremities: non-tender BLE and non-edematous  Psych:  oriented to time, place and person, mood and affect are within normal limits     OMM EXAM:  The patient did not complain of a Chief complaint requiring OMM.   Chief Complaint:syncopal episode    Structural Exam: No Interest    LAB RESULTS:  Recent Results (from the past 8 hour(s))   CBC Auto Differential    Collection Time: 04/14/19  4:04 PM   Result Value Ref Range    WBC 15.2 (H) 4.5 - 13.5 k/uL    RBC 4.01 4.0 - 5.2 m/uL    Hemoglobin 11.3 (L) 12.0 - 16.0 g/dL    Hematocrit 35.1 (L) 36 - 46 %    MCV 87.7 80 - 100 fL    MCH 28.1 26 - 34 pg    MCHC 32.1 31 - 37 g/dL    RDW 14.8 11.5 - 14.9 %    Platelets 089 758 - 938 k/uL    MPV 8.4 6.0 - 12.0 fL    NRBC Automated NOT REPORTED per 100 WBC    Differential Type NOT REPORTED     Immature Granulocytes NOT REPORTED 0 %    Absolute Immature Granulocyte NOT REPORTED 0.00 - 0.30 k/uL    WBC Morphology NOT REPORTED     RBC Morphology NOT REPORTED     Platelet Estimate NOT REPORTED     Seg Neutrophils 91 (H) 34 - 64 %    Lymphocytes 5 (L) 25 - 45 %    Monocytes 4 2 - 8 %    Eosinophils % 0 0 - 4 %    Basophils 0 0 - 2 %    Segs Absolute 13.83 (H) 1.3 - 9.1 k/uL    Absolute Lymph # 0.76 (L) 1.2 - 5.2 k/uL    Absolute Mono # 0.61 0.1 - 1.3 k/uL    Absolute Eos # 0.00 0.0 - 0.4 k/uL    Basophils # 0.00 0.0 - 0.2 k/uL    Morphology ANISOCYTOSIS PRESENT    Lactate, Sepsis    Collection Time: 04/14/19  4:04 PM   Result Value Ref Range    Lactic Acid, Sepsis 2.2 (H) 0.5 - 1.9 mmol/L    Lactic Acid, Sepsis, Whole Blood NOT REPORTED 0.5 - 1.9 mmol/L   Comprehensive Metabolic Panel    Collection Time: 04/14/19  4:04 PM   Result Value Ref Range    Glucose 106 (H) 70 - 99 mg/dL    BUN 15 6 - 20 mg/dL    CREATININE 0.65 0.50 - 0.90 mg/dL    Bun/Cre Ratio NOT REPORTED 9 - 20    Calcium 8.7 8.6 - 10.4 mg/dL    Sodium 137 review. Dose modulation, iterative reconstruction, and/or weight based adjustment of the mA/kV was utilized to reduce the radiation dose to as low as reasonably achievable. COMPARISON: None. HISTORY: ORDERING SYSTEM PROVIDED HISTORY: Septic, recent , mild b/l LQ TTP TECHNOLOGIST PROVIDED HISTORY: IV Only Contrast Ordering Physician Provided Reason for Exam: Septic, recent , mild b/l LQ TTP . Acuity: Unknown Type of Exam: Unknown Relevant Medical/Surgical History: Surgical hx -  19. FINDINGS: Lower Chest:  Visualized portion of the lower chest demonstrates no acute abnormality. Organs: The liver, gallbladder, biliary system, pancreas, spleen, adrenal glands, and left kidney are normal.  The right kidney enhances normally. There is mild right hydronephrosis secondary to extrinsic compression of the distal right ureter from the enlarged postpartum uterus. GI/Bowel: The bowel is normal, including the appendix. Pelvis: Enlarged, heterogeneous postpartum uterus. Urinary bladder unremarkable. No pelvic mass. Peritoneum/Retroperitoneum: No free air or free fluid. No focal fluid collection. Vascular structures unremarkable. No abnormal lymph node. Bones/Soft Tissues: No acute osseous abnormality. Changes of recent low transverse incision with non rim enhancing superficial subincisional fluid collection measuring 6.2 cm transverse by 1.3 cm AP x 2.3 cm craniocaudal.     1. Changes of recent  section with small seroma in the superficial subincisional soft tissues of the lower midline abdominal wall. 2. Mild right hydronephrosis secondary to extrinsic compression from the enlarged postpartum uterus. Ct Chest Pulmonary Embolism W Contrast    Result Date: 2019  EXAMINATION: CTA OF THE CHEST 2019 6:51 pm TECHNIQUE: CTA of the chest was performed after the administration of intravenous contrast.  Multiplanar reformatted images are provided for review.   MIP images are provided for review. Dose modulation, iterative reconstruction, and/or weight based adjustment of the mA/kV was utilized to reduce the radiation dose to as low as reasonably achievable. COMPARISON: None. HISTORY: ORDERING SYSTEM PROVIDED HISTORY: SOB, syncope, elevated d-dimer recent  TECHNOLOGIST PROVIDED HISTORY: Ordering Physician Provided Reason for Exam: SOB, syncope, elevated d-dimer recent  (19). Patient c/o pain to right arm pit (lymph nodes). Acuity: Unknown Type of Exam: Unknown Relevant Medical/Surgical History: No relevant surgical hx to area. FINDINGS: Pulmonary Arteries: Pulmonary arteries are adequately opacified for evaluation. No evidence of intraluminal filling defect to suggest pulmonary embolism. Main pulmonary artery is normal in caliber. Mediastinum: No evidence of mediastinal lymphadenopathy. The heart and pericardium demonstrate no acute abnormality. There is no acute abnormality of the thoracic aorta. Lungs/pleura: The lungs are without acute process. No focal consolidation or pulmonary edema. No evidence of pleural effusion or pneumothorax. Upper Abdomen: Limited images of the upper abdomen are unremarkable. Soft Tissues/Bones: No acute bone or soft tissue abnormality. No evidence of pulmonary embolism or acute pulmonary abnormality. ASSESSMENT & PLAN:    Leeanna Ferrera is a 20 y.o. female  POD#11 s/p PLTCS with post op fever  ATSO Dr. La Kerr  Infection likely due to mastitis of right breast, but can not rule out endometritis and will treat for both.   CBC, BMP daily   IVF 125ml/hr LR  Ampicillin/Gentamycin/Clindamycin IV until afebrile x 24 hours  S/p Vanco, Zosyn in ED  General Diet  Motrin/Tylenol PRN pain  Activity: as tolerated with 15 lb weight restriction   DVT prophylaxis: SCDs  UCx pending  GC/C pending  Blood cultures pending  CT Abd/Pelvis: post surgical changes  CT Chest: No PE or acute pulmonary abnormalities   CXR: No cardiopulmonary abnormalities   Influenza swab negative     Mastitis of Right Breast  Continue breast feeding    Endometritis     Trichomonas  Patient given 1500mg Flagyl in ED. Will give 500mg more to treat for Trichomonas. Patient counseled that partner needs to be treated and to refrain from intercourse until he is. Leukocytosis   WBC 15.2  Will trend    Lactic Acidosis (resolved)  Lactate 2.2>1.5    Patient Active Problem List    Diagnosis Date Noted    Postoperative fever 04/14/2019     Priority: High    Mastitis 04/14/2019     Priority: High    Endometritis 04/14/2019     Priority: Medium    Leukocytosis 04/14/2019    Lactic acidosis (resolved) 04/14/2019    PLTCS 4/3/19 F (in [facility]) 04/14/2019       Plan discussed with Dr. Cristina Conroy, who is agreeable.      Attending's Name: Dr. Reba Carpenter DO  Ob/Gyn Resident    4/14/2019, 8:59 PM

## 2019-04-15 NOTE — DISCHARGE INSTR - DIET

## 2019-04-15 NOTE — PLAN OF CARE
Problem: Falls - Risk of:  Goal: Will remain free from falls  Description  Will remain free from falls  4/15/2019 1621 by Lynn Pena RN  Outcome: Ongoing  Pt. Remains free of falls. Appropriate fall precautions in place. Problem: Pain:  Goal: Control of acute pain  Description  Control of acute pain  4/15/2019 1621 by Lynn Pena RN  Outcome: Ongoing  Pt. Verbalizes pain at a tolerable level.

## 2019-04-15 NOTE — PLAN OF CARE
Problem: Falls - Risk of:  Goal: Will remain free from falls  Description  Will remain free from falls  Outcome: Ongoing  Note:   Patient remained free from falls this shift. Call light and bed side table are within reach, bed is in lowest position, and side rails are up x2. Will continue to monitor.    Goal: Absence of physical injury  Description  Absence of physical injury  Outcome: Ongoing     Problem: Pain:  Goal: Pain level will decrease  Description  Pain level will decrease  Outcome: Ongoing  Goal: Control of acute pain  Description  Control of acute pain  Outcome: Ongoing  Goal: Control of chronic pain  Description  Control of chronic pain  Outcome: Ongoing

## 2019-04-15 NOTE — PROGRESS NOTES
Progress Note    Date: 4/15/2019  Time: 6:27 AM    Amaya Poole is a 20 y.o. female [id number] HD# 2    Patient was seen and examined. She has no complaints. Pain is  controlled. Patient is  tolerating oral intake. She is urinating well . Lochia is light. She is  ambulating without difficulty. She is  passing flatus. She denies Fever/Chills, Chest Pain, SOB, N/V, Calf Pain    Vitals:  Vitals:    19 2115 19 2215 19 2221 19 2249   BP: 105/61 112/66  (!) 112/56   Pulse: 94 70 64 82   Resp: 28 20  18   Temp:  97.9 °F (36.6 °C) 98 °F (36.7 °C) 97.9 °F (36.6 °C)   TempSrc:  Oral Oral Oral   SpO2: 97% 98%  99%   Weight:    121 lb 7.6 oz (55.1 kg)   Height:    4' 10\" (1.473 m)       Physical Exam:  General:  no apparent distress, alert and cooperative  Neurologic:  alert, oriented, normal speech, no focal findings or movement disorder noted  Lungs:  No increased work of breathing, good air exchange, clear to auscultation bilaterally, no crackles or wheezing  Heart:  regular rate and rhythm and no murmur    Abdomen: Abdomen soft, non-tender.  BS normal. No masses,  No organomegaly  Fundus: firm, below umbilicus   Incision: clean, dry and intact  Extremities:  no calf tenderness, non edematous    Lab:  Cultures:  GC/C, urine culture and blood cultures: pending  CBC, BMP pending    Assessment/Plan:  Amaya Poole 20 y.o. female  POD# 12 s/p PLTCS 2/2 Failure to dilate with Mastitis and Endometritis   CBC, BMP daily   IVF 125ml/hr LR  Ampicillin/Gentamycin/Clindamycin IV until afebrile x 24 hours  S/p Vanco, Zosyn in ED  General Diet  Motrin/Tylenol PRN pain  Activity: as tolerated with 15 lb weight restriction   DVT prophylaxis: SCDs  UCx pending  GC/C pending  Blood cultures pending  CT Abd/Pelvis: post surgical changes  CT Chest: No PE or acute pulmonary abnormalities   CXR: No cardiopulmonary abnormalities   Influenza swab negative  Continue to breast feed    Encourage ambulation and use of incentive spirometer   Continue care, please page with any questions     Trichomonas  S/p 2g Flagyl    Patient counseled that partner needs to be treated and to refrain from intercourse until he is.     Leukocytosis   WBC 15.2  Will trend     Lactic Acidosis (resolved)  Lactate 2.2>1.5      Patient Active Problem List    Diagnosis Date Noted    Postoperative fever 04/14/2019     Priority: High    Mastitis 04/14/2019     Priority: High    Endometritis 04/14/2019     Priority: Medium    Leukocytosis 04/14/2019    Lactic acidosis (resolved) 04/14/2019    PLTCS 4/3/19 F (in [address] 04/14/2019         Paolo Dougherty DO  Ob/Gyn Resident    4/15/2019, 6:27 AM            Attending Physician Statement  I have discussed the care of Everrett Oas, including pertinent history and exam findings,  with the resident. I have seen and examined the patient and the key elements of all parts of the encounter have been performed by me. I agree with the assessment, plan and orders as documented by the resident. (GC Modifier)    Pt. Seen and examined. She is resting comfortably in bed and she is denying any pain. She states she is eating well and voiding. She is requesting discharge home. Discussed awaiting to make she sure is afebrile s/p fever, tachycardia, syncopal episode (cleared in ED), and leukocytosis. Denies CP/sBO/F/CH/N/V/HA. Recent Results (from the past 24 hour(s))   Culture Blood #1    Collection Time: 04/14/19  4:04 PM   Result Value Ref Range    Specimen Description . BLOOD     Special Requests 8 PURPLE 2 RED.  LEFT FOREARM     Culture NO GROWTH 1 HOUR    CBC Auto Differential    Collection Time: 04/14/19  4:04 PM   Result Value Ref Range    WBC 15.2 (H) 4.5 - 13.5 k/uL    RBC 4.01 4.0 - 5.2 m/uL    Hemoglobin 11.3 (L) 12.0 - 16.0 g/dL    Hematocrit 35.1 (L) 36 - 46 %    MCV 87.7 80 - 100 fL    MCH 28.1 26 - 34 pg    MCHC 32.1 31 - 37 g/dL    RDW 14.8 11.5 - 14.9 %    Platelets 995 269 - 879 k/uL    MPV 8.4 6.0 - 12.0 fL    NRBC Automated NOT REPORTED per 100 WBC    Differential Type NOT REPORTED     Immature Granulocytes NOT REPORTED 0 %    Absolute Immature Granulocyte NOT REPORTED 0.00 - 0.30 k/uL    WBC Morphology NOT REPORTED     RBC Morphology NOT REPORTED     Platelet Estimate NOT REPORTED     Seg Neutrophils 91 (H) 34 - 64 %    Lymphocytes 5 (L) 25 - 45 %    Monocytes 4 2 - 8 %    Eosinophils % 0 0 - 4 %    Basophils 0 0 - 2 %    Segs Absolute 13.83 (H) 1.3 - 9.1 k/uL    Absolute Lymph # 0.76 (L) 1.2 - 5.2 k/uL    Absolute Mono # 0.61 0.1 - 1.3 k/uL    Absolute Eos # 0.00 0.0 - 0.4 k/uL    Basophils # 0.00 0.0 - 0.2 k/uL    Morphology ANISOCYTOSIS PRESENT    Lactate, Sepsis    Collection Time: 04/14/19  4:04 PM   Result Value Ref Range    Lactic Acid, Sepsis 2.2 (H) 0.5 - 1.9 mmol/L    Lactic Acid, Sepsis, Whole Blood NOT REPORTED 0.5 - 1.9 mmol/L   Comprehensive Metabolic Panel    Collection Time: 04/14/19  4:04 PM   Result Value Ref Range    Glucose 106 (H) 70 - 99 mg/dL    BUN 15 6 - 20 mg/dL    CREATININE 0.65 0.50 - 0.90 mg/dL    Bun/Cre Ratio NOT REPORTED 9 - 20    Calcium 8.7 8.6 - 10.4 mg/dL    Sodium 137 135 - 144 mmol/L    Potassium 4.1 3.7 - 5.3 mmol/L    Chloride 102 98 - 107 mmol/L    CO2 21 20 - 31 mmol/L    Anion Gap 14 9 - 17 mmol/L    Alkaline Phosphatase 161 (H) 35 - 104 U/L    ALT 21 5 - 33 U/L    AST 20 <32 U/L    Total Bilirubin 0.31 0.3 - 1.2 mg/dL    Total Protein 7.8 6.4 - 8.3 g/dL    Alb 3.8 3.5 - 5.2 g/dL    Albumin/Globulin Ratio NOT REPORTED 1.0 - 2.5    GFR Non-African American >60 >60 mL/min    GFR African American >60 >60 mL/min    GFR Comment          GFR Staging NOT REPORTED    Protime-INR    Collection Time: 04/14/19  4:04 PM   Result Value Ref Range    Protime 14.2 11.8 - 14.6 sec    INR 1.1    APTT    Collection Time: 04/14/19  4:04 PM   Result Value Ref Range    PTT 33.6 24.0 - 36.0 sec   D-Dimer, Quantitative    Collection Time: 04/14/19  4:04 PM   Result Value Ref Range D-Dimer, Quant 1.21 (H) 0.00 - 0.59 mg/L FEU   Rapid influenza A/B antigens    Collection Time: 04/14/19  4:15 PM   Result Value Ref Range    Specimen Description . NARES     Special Requests NOT REPORTED     Direct Exam       PRESUMPTIVE NEGATIVE for Influenza A + B antigens. PCR testing to confirm this result is available upon request.  Specimen will be saved in the laboratory for 7 days. Please call [phone] if PCR testing is indicated. Culture Blood #1    Collection Time: 04/14/19  4:31 PM   Result Value Ref Range    Specimen Description . BLOOD     Special Requests 2CC RED 8CC PURPLE LT WRIST     Culture NO GROWTH 1 HOUR    Lactate, Sepsis    Collection Time: 04/14/19  5:54 PM   Result Value Ref Range    Lactic Acid, Sepsis 1.5 0.5 - 1.9 mmol/L    Lactic Acid, Sepsis, Whole Blood NOT REPORTED 0.5 - 1.9 mmol/L   Vaginitis DNA Probe    Collection Time: 04/14/19  6:11 PM   Result Value Ref Range    Specimen Description . VAGINA     Special Requests NOT REPORTED     Direct Exam POSITIVE for Trichomonas vaginalis (A)     Direct Exam NEGATIVE for Candida sp. Direct Exam NEGATIVE for Gardnerella vaginalis     Direct Exam       Method of testing is a DNA probe intended for detection and identification of Candida species, Gardnerella vaginalis, and Trichomonas vaginalis nucleic acid in vaginal fluid specimens from patients with symptoms of vaginitis/vaginosis.    Urinalysis with Microscopic    Collection Time: 04/14/19  6:58 PM   Result Value Ref Range    Color, UA YELLOW YELLOW    Turbidity UA CLOUDY (A) CLEAR    Glucose, Ur NEGATIVE NEGATIVE    Bilirubin Urine NEGATIVE NEGATIVE    Ketones, Urine NEGATIVE NEGATIVE    Specific Gravity, UA 1.005 1.000 - 1.030    Urine Hgb LARGE (A) NEGATIVE    pH, UA 6.0 5.0 - 8.0    Protein, UA NEGATIVE NEGATIVE    Urobilinogen, Urine Normal Normal    Nitrite, Urine NEGATIVE NEGATIVE    Leukocyte Esterase, Urine LARGE (A) NEGATIVE    Urinalysis

## 2019-04-15 NOTE — FLOWSHEET NOTE
Patient had a  a couple weeks ago; she has a baby girl. Patient noted her dad and siblings are helping out. Patient said she's been \"praying a lot\" since she came in and was glad writer offered to pray with her. 04/15/19 1321   Encounter Summary   Services provided to: Patient   Referral/Consult From: [address] [place] [address] Parent; Family members   Continue Visiting   (4-15-19)   Complexity of Encounter Moderate   Length of Encounter 15 minutes   Spiritual Assessment Completed Yes   Routine   Type Initial   Spiritual/Yazidism   Type Spiritual support   Assessment Calm; Approachable   Intervention Active listening;Explored feelings, thoughts, concerns;Explored coping resources;Nurtured hope;Prayer;Sustaining presence/ Ministry of presence; Discussed illness/injury and it's impact   Outcome Expressed gratitude;Expressed feelings/needs/concerns;Receptive

## 2019-04-15 NOTE — ED NOTES
OB to see pt at bedside and to perform pelvic exam. Hernandez RN at bedside to assist.      Luis Morales RN  04/14/19 3941

## 2019-04-15 NOTE — DISCHARGE SUMMARY
OB/GYN Discharge Summary    Patient Name: Monica Simms  Patient : 2000  Primary Care Physician: No primary care provider on file. Admit Date: 2019    Principal Diagnosis: Post-operative fever     Other Diagnosis:   Endometritis [N71.9]  Patient Active Problem List   Diagnosis    Postoperative fever    Leukocytosis    Lactic acidosis (resolved)    PLTCS 4/3/19 F (in [facility])    Endometritis    Mastitis    Trichomonas vaginitis    Trichomonas infection     Infection: Yes  Hospital Acquired: No  Surgical Operations & Procedures: none  Consultations: none    Pertinent Findings & Procedures:   Monica Simms is a 20 y.o. female  POD# 6 s/p PLTCS with a post operative fever and found to have Mastitis and Endometritis,. She was admitted for IV antibiotics & received Ampicillin, Gentamycin, and Clindamycin x 24 hours. She was also found to have Trichomonas and was treated with 2g Flagyl. CT abd/pelvis, CXR and CT PE were all normal on admission. She was discharged home on Dicloxacillin x 10 days.      Course of patient: patient improved throughout course of admission     Discharge to: Home    Readmission planned: No    Recommendations on Discharge:     Medications:     Medication List      START taking these medications    clindamycin 300 MG capsule  Commonly known as:  CLEOCIN  Take 1 capsule by mouth 4 times daily for 7 days     dicloxacillin 500 MG capsule  Commonly known as:  DYNAPEN  Take 1 capsule by mouth 4 times daily for 7 days        CONTINUE taking these medications    docusate sodium 100 MG capsule  Commonly known as:  COLACE  Take 1 capsule by mouth daily     ibuprofen 800 MG tablet  Commonly known as:  ADVIL;MOTRIN     PRENATAL 1+1 PO        STOP taking these medications    HYDROcodone-acetaminophen 5-325 MG per tablet  Commonly known as:  [address]           Where to Get Your Medications      You can get these medications from any pharmacy    Bring a paper prescription for each of these medications  · clindamycin 300 MG capsule  · dicloxacillin 500 MG capsule           Activity: pelvic rest x 6 weeks, no lifting greater than 15 lbs  Diet: regular diet  Follow up: 1-2 weeks     Condition on discharge: good and stable   Discharge Date: 4/15/19    Comments:  Home care, Follow-up care, restrictions reviewed.     Oracio Price DO  Ob/Gyn Resident  [facility]  4/15/2019, 6:42 PM

## 2019-04-15 NOTE — ED NOTES
Report given to Tamara Garcia from [address]. Report method by phone   The following was reviewed with receiving RN:   Current vital signs:  /66   Pulse 64   Temp 98 °F (36.7 °C) (Oral)   Resp 20   Ht 4' 10\" (1.473 m)   Wt 130 lb (59 kg)   LMP  (LMP Unknown)   SpO2 98%   BMI 27.17 kg/m²                MEWS Score: 1     Any medication or safety alerts were reviewed. Any pending diagnostics and notifications were also reviewed, as well as any safety concerns or issues, abnormal labs, abnormal imaging, and abnormal assessment findings. Questions were answered.             Nidia Zamora RN  04/14/19 3413

## 2019-04-16 LAB
C TRACH DNA GENITAL QL NAA+PROBE: NEGATIVE
N. GONORRHOEAE DNA: NEGATIVE
SPECIMEN DESCRIPTION: NORMAL

## 2019-04-16 NOTE — PROGRESS NOTES
Discharge instructions given  Verbalized understanding. Prescriptions for antibiotics given to pt.   Waiting for ride to get here for discharge

## 2019-04-20 LAB
CULTURE: NORMAL
CULTURE: NORMAL
Lab: NORMAL
Lab: NORMAL
SPECIMEN DESCRIPTION: NORMAL
SPECIMEN DESCRIPTION: NORMAL

## 2019-09-25 ENCOUNTER — HOSPITAL ENCOUNTER (EMERGENCY)
Age: 19
Discharge: HOME | End: 2019-09-25
Attending: EMERGENCY MEDICINE
Payer: COMMERCIAL

## 2019-09-25 VITALS
WEIGHT: 120 LBS | OXYGEN SATURATION: 100 % | HEIGHT: 58 IN | BODY MASS INDEX: 25.19 KG/M2 | DIASTOLIC BLOOD PRESSURE: 72 MMHG | HEART RATE: 106 BPM | TEMPERATURE: 98.6 F | RESPIRATION RATE: 16 BRPM | SYSTOLIC BLOOD PRESSURE: 124 MMHG

## 2019-09-25 DIAGNOSIS — N39.0 ACUTE UTI (URINARY TRACT INFECTION): Primary | ICD-10-CM

## 2019-09-25 LAB
-: ABNORMAL
AMORPHOUS: ABNORMAL
BACTERIA: ABNORMAL
BILIRUBIN URINE: NEGATIVE
CASTS UA: ABNORMAL /LPF
COLOR: ABNORMAL
COMMENT UA: ABNORMAL
CRYSTALS, UA: ABNORMAL /HPF
EPITHELIAL CELLS UA: ABNORMAL /HPF
GLUCOSE URINE: NEGATIVE
HCG(URINE) PREGNANCY TEST: NEGATIVE
KETONES, URINE: ABNORMAL
LEUKOCYTE ESTERASE, URINE: ABNORMAL
MUCUS: ABNORMAL
NITRITE, URINE: NEGATIVE
OTHER OBSERVATIONS UA: ABNORMAL
PH UA: 8 (ref 5–8)
PROTEIN UA: ABNORMAL
RBC UA: ABNORMAL /HPF
RENAL EPITHELIAL, UA: ABNORMAL /HPF
SPECIFIC GRAVITY UA: 1.02 (ref 1–1.03)
TRICHOMONAS: ABNORMAL
TURBIDITY: ABNORMAL
URINE HGB: ABNORMAL
UROBILINOGEN, URINE: NORMAL
WBC UA: ABNORMAL /HPF
YEAST: ABNORMAL

## 2019-09-25 PROCEDURE — 81025 URINE PREGNANCY TEST: CPT

## 2019-09-25 PROCEDURE — 99283 EMERGENCY DEPT VISIT LOW MDM: CPT

## 2019-09-25 PROCEDURE — 87086 URINE CULTURE/COLONY COUNT: CPT

## 2019-09-25 PROCEDURE — 87077 CULTURE AEROBIC IDENTIFY: CPT

## 2019-09-25 PROCEDURE — 87186 SC STD MICRODIL/AGAR DIL: CPT

## 2019-09-25 PROCEDURE — 81001 URINALYSIS AUTO W/SCOPE: CPT

## 2019-09-25 RX ORDER — CEPHALEXIN 500 MG/1
500 CAPSULE ORAL 4 TIMES DAILY
Qty: 28 CAPSULE | Refills: 0 | Status: SHIPPED | OUTPATIENT
Start: 2019-09-25 | End: 2019-10-02

## 2019-09-25 ASSESSMENT — PAIN SCALES - GENERAL: PAINLEVEL_OUTOF10: 6

## 2019-09-25 NOTE — ED PROVIDER NOTES
capsule Take 1 capsule by mouth daily, Disp-14 capsule, R-0Print             ALLERGIES     Patient has no known allergies. FAMILY HISTORY     History reviewed. No pertinent family history. No family status information on file. None otherwise stated in nurses notes    SOCIAL HISTORY      reports that she has never smoked. She has never used smokeless tobacco. She reports that she has current or past drug history. Drug: Marijuana. She reports that she does not drink alcohol. lives at home with others     PHYSICAL EXAM    (up to 7 for level 4, 8 or more for level 5)     ED Triage Vitals [09/25/19 1315]   BP Temp Temp Source Pulse Resp SpO2 Height Weight   124/72 98.6 °F (37 °C) Oral 106 16 100 % 4' 10\" (1.473 m) 120 lb (54.4 kg)       Physical Exam   Nursing note and vitals reviewed. Constitutional: Oriented to person, place, and time and well-developed, well-nourished. Head: Normocephalic and atraumatic. Ear: External ears normal.   Nose: Nose normal and midline. Eyes: Conjunctivae and EOM are normal. Pupils are equal, round, and reactive to light. Neck: Normal range of motion. Cardiovascular: Normal rate, regular rhythm, normal heart sounds and intact distal pulses. Pulmonary/Chest: Effort normal and breath sounds normal. No respiratory distress. No wheezes. No rales. No chest tenderness. Abdominal: Soft. Bowel sounds are normal. No distension and no mass. Mild suprapubic abdominal tenderness. There is no rebound and no guarding. no cva tenderness. Musculoskeletal: Normal range of motion. Neurological: Alert and oriented to person, place, and time. Gait normal. GCS score is 15. Skin: Skin is warm and dry. No rash noted. No erythema. No pallor.              DIAGNOSTIC RESULTS       LABS:  Labs Reviewed   URINE RT REFLEX TO CULTURE - Abnormal; Notable for the following components:       Result Value    Color, UA RED (*)     Turbidity UA TURBID (*)     Ketones, Urine TRACE (*)     Urine Hgb LARGE (*)     Protein, UA 4+ (*)     Leukocyte Esterase, Urine LARGE (*)     All other components within normal limits   MICROSCOPIC URINALYSIS - Abnormal; Notable for the following components:    Bacteria, UA MODERATE (*)     Amorphous, UA 1+ (*)     All other components within normal limits   PREGNANCY, URINE       All other labs were within normal range or not returned as of this dictation. EMERGENCY DEPARTMENT COURSE and DIFFERENTIAL DIAGNOSIS/MDM:   Vitals:    Vitals:    09/25/19 1315   BP: 124/72   Pulse: 106   Resp: 16   Temp: 98.6 °F (37 °C)   TempSrc: Oral   SpO2: 100%   Weight: 120 lb (54.4 kg)   Height: 4' 10\" (1.473 m)       Dysuria, hematuria. UA shows large leukocytes with blood. Will treat for UTI. Discussed results and plan with the pt. They expressed appropriate understanding. Pt given close follow up, supportive care instructions and strict return instructions at the bedside. Patient instructed to return to the emergency room if symptoms worsen, return, or any other concern right away which is agreed. ED MEDS:  Orders Placed This Encounter   Medications    cephALEXin (KEFLEX) 500 MG capsule     Sig: Take 1 capsule by mouth 4 times daily for 7 days     Dispense:  28 capsule     Refill:  0           PROCEDURES:  None      FINAL IMPRESSION      1.  Acute UTI (urinary tract infection)          DISPOSITION/PLAN   DISPOSITION Decision To Discharge    PATIENT REFERRED TO:  [facility] ED  [facility] [address] [place] [address] [address] [address]  [phone]  Call         DISCHARGE MEDICATIONS:  Discharge Medication List as of 9/25/2019  1:52 PM      START taking these medications    Details   cephALEXin (KEFLEX) 500 MG capsule Take 1 capsule by mouth 4 times daily for 7 days, Disp-28 capsule, R-0Print             (Please note that portions of this note were completed with a voice recognition program.  Efforts were made to edit the dictations but occasionally words are

## 2019-09-27 LAB
CULTURE: ABNORMAL
Lab: ABNORMAL
SPECIMEN DESCRIPTION: ABNORMAL

## 2020-02-25 ENCOUNTER — HOSPITAL ENCOUNTER (OUTPATIENT)
Age: 20
Discharge: HOME | End: 2020-02-25

## 2020-02-25 LAB — RUBV IGG SER QL: 6.7 IU/ML

## 2020-02-25 PROCEDURE — 86787 VARICELLA-ZOSTER ANTIBODY: CPT

## 2020-02-25 PROCEDURE — 86762 RUBELLA ANTIBODY: CPT

## 2020-02-25 PROCEDURE — 86765 RUBEOLA ANTIBODY: CPT

## 2020-02-25 PROCEDURE — 86735 MUMPS ANTIBODY: CPT

## 2020-02-25 PROCEDURE — 86481 TB AG RESPONSE T-CELL SUSP: CPT

## 2020-02-26 LAB
MEASLES IMMUNE (IGG): 2.42
MUV IGG SER QL: 1.67
VZV IGG SER QL IA: 1.61

## 2020-02-29 LAB — T-SPOT TB TEST: NORMAL

## 2020-03-15 ENCOUNTER — HOSPITAL ENCOUNTER (EMERGENCY)
Age: 20
Discharge: HOME | End: 2020-03-15
Attending: EMERGENCY MEDICINE
Payer: COMMERCIAL

## 2020-03-15 ENCOUNTER — APPOINTMENT (OUTPATIENT)
Dept: GENERAL RADIOLOGY | Age: 20
End: 2020-03-15
Payer: COMMERCIAL

## 2020-03-15 VITALS
HEIGHT: 58 IN | HEART RATE: 86 BPM | SYSTOLIC BLOOD PRESSURE: 104 MMHG | DIASTOLIC BLOOD PRESSURE: 69 MMHG | OXYGEN SATURATION: 98 % | RESPIRATION RATE: 20 BRPM | BODY MASS INDEX: 24.56 KG/M2 | TEMPERATURE: 98.5 F | WEIGHT: 117 LBS

## 2020-03-15 LAB
ADENOVIRUS PCR: NOT DETECTED
BORDETELLA PARAPERTUSSIS: NOT DETECTED
BORDETELLA PERTUSSIS PCR: NOT DETECTED
CHLAMYDIA PNEUMONIAE BY PCR: NOT DETECTED
CORONAVIRUS 229E PCR: NOT DETECTED
CORONAVIRUS HKU1 PCR: NOT DETECTED
CORONAVIRUS NL63 PCR: NOT DETECTED
CORONAVIRUS OC43 PCR: NOT DETECTED
DIRECT EXAM: NORMAL
DIRECT EXAM: NORMAL
HUMAN METAPNEUMOVIRUS PCR: NOT DETECTED
INFLUENZA A BY PCR: NOT DETECTED
INFLUENZA A H1 (2009) PCR: ABNORMAL
INFLUENZA A H1 PCR: ABNORMAL
INFLUENZA A H3 PCR: ABNORMAL
INFLUENZA B BY PCR: NOT DETECTED
Lab: NORMAL
Lab: NORMAL
MYCOPLASMA PNEUMONIAE PCR: NOT DETECTED
PARAINFLUENZA 1 PCR: NOT DETECTED
PARAINFLUENZA 2 PCR: NOT DETECTED
PARAINFLUENZA 3 PCR: NOT DETECTED
PARAINFLUENZA 4 PCR: NOT DETECTED
RESP SYNCYTIAL VIRUS PCR: NOT DETECTED
RHINO/ENTEROVIRUS PCR: DETECTED
SPECIMEN DESCRIPTION: ABNORMAL
SPECIMEN DESCRIPTION: NORMAL
SPECIMEN DESCRIPTION: NORMAL

## 2020-03-15 PROCEDURE — U0002 COVID-19 LAB TEST NON-CDC: HCPCS

## 2020-03-15 PROCEDURE — 99283 EMERGENCY DEPT VISIT LOW MDM: CPT

## 2020-03-15 PROCEDURE — 87804 INFLUENZA ASSAY W/OPTIC: CPT

## 2020-03-15 PROCEDURE — 0100U HC RESPIRPTHGN MULT REV TRANS & AMP PRB TECH 21 TRGT: CPT

## 2020-03-15 PROCEDURE — 87880 STREP A ASSAY W/OPTIC: CPT

## 2020-03-15 PROCEDURE — 71046 X-RAY EXAM CHEST 2 VIEWS: CPT

## 2020-03-15 ASSESSMENT — PAIN SCALES - GENERAL: PAINLEVEL_OUTOF10: 7

## 2020-03-15 ASSESSMENT — ENCOUNTER SYMPTOMS
COUGH: 1
RHINORRHEA: 1
WHEEZING: 0

## 2020-03-15 ASSESSMENT — PAIN DESCRIPTION - PAIN TYPE: TYPE: ACUTE PAIN

## 2020-03-15 ASSESSMENT — PAIN DESCRIPTION - LOCATION: LOCATION: GENERALIZED

## 2020-03-15 NOTE — ED PROVIDER NOTES
[address] ED  eMERGENCY dEPARTMENT eNCOUnter   Independent Attestation     Pt Name: Mark Garcias  MRN: [id number]  [place] 2000  Date of evaluation: 3/15/20       Mark Garcias is a 23 y.o. female who presents with Cough; Generalized Body Aches; and Nasal Congestion        Based on the medical record, the care appears appropriate. I was personally available for consultation in the Emergency Department.     Lucian Alvarado MD  Attending Emergency  Physician                  Lucina Alvarado MD  03/15/20 [id number]

## 2020-07-01 ENCOUNTER — HOSPITAL ENCOUNTER (EMERGENCY)
Age: 20
Discharge: HOME | End: 2020-07-01
Attending: EMERGENCY MEDICINE
Payer: COMMERCIAL

## 2020-07-01 VITALS
BODY MASS INDEX: 24.14 KG/M2 | WEIGHT: 115 LBS | SYSTOLIC BLOOD PRESSURE: 107 MMHG | RESPIRATION RATE: 16 BRPM | DIASTOLIC BLOOD PRESSURE: 61 MMHG | TEMPERATURE: 98.4 F | OXYGEN SATURATION: 100 % | HEART RATE: 84 BPM | HEIGHT: 58 IN

## 2020-07-01 LAB
-: ABNORMAL
AMORPHOUS: ABNORMAL
BACTERIA: ABNORMAL
BILIRUBIN URINE: NEGATIVE
CASTS UA: ABNORMAL /LPF (ref 0–8)
COLOR: YELLOW
CRYSTALS, UA: ABNORMAL /HPF
DIRECT EXAM: ABNORMAL
EPITHELIAL CELLS UA: ABNORMAL /HPF (ref 0–5)
GLUCOSE URINE: NEGATIVE
HCG(URINE) PREGNANCY TEST: NEGATIVE
KETONES, URINE: ABNORMAL
LEUKOCYTE ESTERASE, URINE: ABNORMAL
Lab: ABNORMAL
MUCUS: ABNORMAL
NITRITE, URINE: NEGATIVE
OTHER OBSERVATIONS UA: ABNORMAL
PH UA: 6 (ref 5–8)
PROTEIN UA: ABNORMAL
RBC UA: ABNORMAL /HPF (ref 0–4)
RENAL EPITHELIAL, UA: ABNORMAL /HPF
SPECIFIC GRAVITY UA: 1.03 (ref 1–1.03)
SPECIMEN DESCRIPTION: ABNORMAL
TRICHOMONAS: ABNORMAL
TURBIDITY: ABNORMAL
URINE HGB: NEGATIVE
UROBILINOGEN, URINE: NORMAL
WBC UA: ABNORMAL /HPF (ref 0–5)
YEAST: ABNORMAL

## 2020-07-01 PROCEDURE — 6360000002 HC RX W HCPCS: Performed by: STUDENT IN AN ORGANIZED HEALTH CARE EDUCATION/TRAINING PROGRAM

## 2020-07-01 PROCEDURE — 96372 THER/PROPH/DIAG INJ SC/IM: CPT

## 2020-07-01 PROCEDURE — 87491 CHLMYD TRACH DNA AMP PROBE: CPT

## 2020-07-01 PROCEDURE — 87591 N.GONORRHOEAE DNA AMP PROB: CPT

## 2020-07-01 PROCEDURE — 6370000000 HC RX 637 (ALT 250 FOR IP): Performed by: STUDENT IN AN ORGANIZED HEALTH CARE EDUCATION/TRAINING PROGRAM

## 2020-07-01 PROCEDURE — 87510 GARDNER VAG DNA DIR PROBE: CPT

## 2020-07-01 PROCEDURE — 87480 CANDIDA DNA DIR PROBE: CPT

## 2020-07-01 PROCEDURE — 87660 TRICHOMONAS VAGIN DIR PROBE: CPT

## 2020-07-01 PROCEDURE — 81025 URINE PREGNANCY TEST: CPT

## 2020-07-01 PROCEDURE — 99284 EMERGENCY DEPT VISIT MOD MDM: CPT

## 2020-07-01 PROCEDURE — 81001 URINALYSIS AUTO W/SCOPE: CPT

## 2020-07-01 RX ORDER — AZITHROMYCIN 250 MG/1
500 TABLET, FILM COATED ORAL ONCE
Status: COMPLETED | OUTPATIENT
Start: 2020-07-01 | End: 2020-07-01

## 2020-07-01 RX ORDER — CEFTRIAXONE SODIUM 250 MG/1
250 INJECTION, POWDER, FOR SOLUTION INTRAMUSCULAR; INTRAVENOUS ONCE
Status: COMPLETED | OUTPATIENT
Start: 2020-07-01 | End: 2020-07-01

## 2020-07-01 RX ORDER — CEPHALEXIN 500 MG/1
500 CAPSULE ORAL 4 TIMES DAILY
Qty: 28 CAPSULE | Refills: 0 | Status: SHIPPED | OUTPATIENT
Start: 2020-07-01 | End: 2020-07-08

## 2020-07-01 RX ORDER — METRONIDAZOLE 500 MG/1
500 TABLET ORAL ONCE
Status: DISCONTINUED | OUTPATIENT
Start: 2020-07-01 | End: 2020-07-01 | Stop reason: HOSPADM

## 2020-07-01 RX ORDER — METRONIDAZOLE 500 MG/1
500 TABLET ORAL 2 TIMES DAILY
Qty: 14 TABLET | Refills: 0 | Status: SHIPPED | OUTPATIENT
Start: 2020-07-01 | End: 2020-07-08

## 2020-07-01 RX ORDER — CEPHALEXIN 500 MG/1
500 CAPSULE ORAL ONCE
Status: COMPLETED | OUTPATIENT
Start: 2020-07-01 | End: 2020-07-01

## 2020-07-01 RX ADMIN — AZITHROMYCIN 500 MG: 250 TABLET, FILM COATED ORAL at 01:07

## 2020-07-01 RX ADMIN — CEFTRIAXONE SODIUM 250 MG: 250 INJECTION, POWDER, FOR SOLUTION INTRAMUSCULAR; INTRAVENOUS at 01:07

## 2020-07-01 RX ADMIN — CEPHALEXIN 500 MG: 500 CAPSULE ORAL at 01:54

## 2020-07-01 NOTE — ED TRIAGE NOTES
Pt arrived to the ED with c/o abdominal cramping and possibly being exposed to an STD. Pt denies having any symptoms at this time and would like to be checked. Pt is alert and oriented x4.

## 2020-07-01 NOTE — ED PROVIDER NOTES
[address]  Emergency Department Encounter  Emergency Medicine Resident         This patient was evaluated in the Emergency Department for symptoms described in the history of present illness. He/she was evaluated in the context of the global COVID-19 pandemic, which necessitated consideration that the patient might be at risk for infection with the SARS-CoV-2 virus that causes COVID-19. Institutional protocols and algorithms that pertain to the evaluation of patients at risk for COVID-19 are in a state of rapid change based on information released by regulatory bodies including the CDC and federal and state organizations. These policies and algorithms were followed during the patient's care in the ED. Pt Name: Steffany Flores  MRN: [id number]  [place] 2000  Date of evaluation: 20  PCP:  No primary care provider on file. CHIEF COMPLAINT       Chief Complaint   Patient presents with    Abdominal Pain    Exposure to STD       HISTORY OF PRESENT ILLNESS  (Location/Symptom, Timing/Onset, Context/Setting, Quality, Duration, Modifying Factors, Severity.)    Steffany Flores is a 23 y.o. female who presents with concern for STD. Patient states that her boyfriend was cheating on her and found out that he had an STD. She denies having any vaginal bleeding discharge abdominal pain nausea vomiting fever sore throat or joint pain. No treatments prior to arrival.          PAST MEDICAL / SURGICAL / SOCIAL / FAMILY HISTORY    has a past medical history of Post partum depression. has a past surgical history that includes  section (2019).     Social History     Socioeconomic History    Marital status: Single     Spouse name: Not on file    Number of children: Not on file    Years of education: Not on file    Highest education level: Not on file   Occupational History    Not on file   Social Needs    Financial resource strain: Not on file    Food insecurity     Worry: Not on file Inability: Not on file    Transportation needs     Medical: Not on file     Non-medical: Not on file   Tobacco Use    Smoking status: Never Smoker    Smokeless tobacco: Never Used   Substance and Sexual Activity    Alcohol use: No    Drug use: Yes     Types: Marijuana    Sexual activity: Never   Lifestyle    Physical activity     Days per week: Not on file     Minutes per session: Not on file    Stress: Not on file   Relationships    Social connections     Talks on phone: Not on file     Gets together: Not on file     Attends Worship service: Not on file     Active member of club or organization: Not on file     Attends meetings of clubs or organizations: Not on file     Relationship status: Not on file    Intimate partner violence     Fear of current or ex partner: Not on file     Emotionally abused: Not on file     Physically abused: Not on file     Forced sexual activity: Not on file   Other Topics Concern    Not on file   Social History Narrative    Not on file       History reviewed. No pertinent family history. Allergies:    Patient has no known allergies. Home Medications:  Prior to Admission medications    Medication Sig Start Date End Date Taking?  Authorizing Provider   metroNIDAZOLE (FLAGYL) 500 MG tablet Take 1 tablet by mouth 2 times daily for 7 days 7/1/20 7/8/20 Yes Macho Nagy DO   cephALEXin (KEFLEX) 500 MG capsule Take 1 capsule by mouth 4 times daily for 7 days 7/1/20 7/8/20 Yes Macho Nagy DO   ibuprofen (ADVIL;MOTRIN) 800 MG tablet Take 800 mg by mouth every 6 hours as needed for Pain    Historical Provider, MD   Prenatal Vit-Fe Fumarate-FA (PRENATAL 1+1 PO) Take by mouth    Historical Provider, MD   docusate sodium (COLACE) 100 MG capsule Take 1 capsule by mouth daily 10/19/18   ABIMAEL Jaime - CNP       REVIEW OF SYSTEMS    (2-9 systems for level 4, 10 or more for level 5)      Gen: No Fever, No chills  EYES: No blurry visiion, no double vision  HENT: No sore throat, No runny nose. No cough  CV: No CP , No palpitation  RESP: No SOB, No respiratory distress  GI: No N/V, No Abdm pain  : No dysuria  SKIN: No rash  MSK: No back pain, no joint pain  NEURO: No HA, no weakness  PSYCH: No SI/HI        PHYSICAL EXAM   (up to 7 for level 4, 8 or more for level 5)     INITIAL VITALS:     /61   Pulse 84   Temp 98.4 °F (36.9 °C) (Oral)   Resp 16   Ht 4' 10\" (1.473 m)   Wt 115 lb (52.2 kg)   LMP 04/01/2020 (Approximate)   SpO2 100%   BMI 24.04 kg/m²     Physical Exam  GENERAL: Not in acute distress, well developed, not diaphoretic  HENT: normocephalic, nose nml  EYES: No sclearal icterus, no occular discharge  NECK: No JVD, trachea midline  PULM: Effort normal, no audible wheezing or stridor  NEURO: Alert, awake, responsive  ABD: Soft nontender nondistended no rebound or guarding. No right upper quadrant tenderness palpation no tenderness over McBurney's point. No CVA tenderness. : External genitalia grossly normal, no lesions excoriations. Thick white vaginal discharge in vaginal vault without bleeding. DIFFERENTIAL  DIAGNOSIS/ MDM   PLAN (LABS / IMAGING / EKG):  Orders Placed This Encounter   Procedures    VAGINITIS DNA PROBE    C.trachomatis N.gonorrhoeae DNA    Urinalysis with Microscopic    PREGNANCY, URINE    Vaginal exam       MEDICATIONS ORDERED:  Orders Placed This Encounter   Medications    cefTRIAXone (ROCEPHIN) injection 250 mg    azithromycin (ZITHROMAX) tablet 500 mg    cephALEXin (KEFLEX) capsule 500 mg    metroNIDAZOLE (FLAGYL) 500 MG tablet     Sig: Take 1 tablet by mouth 2 times daily for 7 days     Dispense:  14 tablet     Refill:  0    cephALEXin (KEFLEX) 500 MG capsule     Sig: Take 1 capsule by mouth 4 times daily for 7 days     Dispense:  28 capsule     Refill:  0         MDM:    Armaan Eason is a 23 y.o. female who presents with concern for STI. Thick vaginal discharge in vault.   Urine pregnancy urinalysis pelvic labs note    FINAL IMPRESSION     1. BV (bacterial vaginosis)    2. Urinary tract infection without hematuria, site unspecified    3. STI (sexually transmitted infection)          DISPOSITION / PLAN   DISPOSITION Decision To Discharge 07/01/2020 01:57:45 AM       Evaluation and treatment course in the ED, and plan of care upon discharge was discussed in length with the patient. Patient had no further questions prior to being discharged and was instructed to return to the ED for new or worsening symptoms. Any changes to existing medications or new prescriptions were reviewed with patient and they expressed understanding of how to correctly take their medications and the possible common side effects. The patient understands that at this time there is no evidence for a more malignant underlying process, but the patient also understands that early in the process of an illness or injury, an emergency department workup can be falsely reassuring. Routine discharge counseling was given, and the patient understands that worsening, changing or persistent symptoms should prompt an immediate call or follow up with his/her primary physician or return to the emergency department. The importance of appropriate follow up was also discussed. More extensive discharge instructions were given in the patients discharge paperwork. PATIENT REFERRED TO:  [facility] AT [address] [address] [phone]  Schedule an appointment as soon as possible for a visit in 2 days  Return to the ER if worsening or any other concern      DISCHARGE MEDICATIONS:  New Prescriptions    CEPHALEXIN (KEFLEX) 500 MG CAPSULE    Take 1 capsule by mouth 4 times daily for 7 days    METRONIDAZOLE (FLAGYL) 500 MG TABLET    Take 1 tablet by mouth 2 times daily for 7 days       Dr. Evans Cooks.  [facility]  Emergency Medicine Resident Physician, PGY-2    (Please note that portions of this note were completed with a voice recognition program.  Efforts were made to edit the dictations but occasionally words are mis-transcribed.)          Manfred Lopez DO  Resident  07/01/20 [id number]

## 2020-07-01 NOTE — ED PROVIDER NOTES
[facility]     Emergency Department     Faculty Attestation    I performed a history and physical examination of the patient and discussed management with the resident. I have reviewed and agree with the residents findings including all diagnostic interpretations, and treatment plans as written. Any areas of disagreement are noted on the chart. I was personally present for the key portions of any procedures. I have documented in the chart those procedures where I was not present during the key portions. I have reviewed the emergency nurses triage note. I agree with the chief complaint, past medical history, past surgical history, allergies, medications, social and family history as documented unless otherwise noted below. Documentation of the HPI, Physical Exam and Medical Decision Making performed by alma is based on my personal performance of the HPI, PE and MDM. For Physician Assistant/ Nurse Practitioner cases/documentation I have personally evaluated this patient and have completed at least one if not all key elements of the E/M (history, physical exam, and MDM). Additional findings are as noted. 22 yo F lower abdominal pain, no fever, no vomit, request std check, no injury,   pe kelly Chen RN escort for exam, abdomen non tender, no distension, no rigidity, no guarding, benign exam currently    BV, uti, no acute abdomen,   EKG Interpretation    Interpreted by me      CRITICAL CARE: There was a high probability of clinically significant/life threatening deterioration in this patient's condition which required my urgent intervention. Total critical care time was 0 minutes. This excludes any time for separately reportable procedures.        Anthony Giron, DO  07/01/20 [address], DO  07/01/20 [address], DO  07/01/20 1700

## 2020-07-02 LAB
C TRACH DNA GENITAL QL NAA+PROBE: ABNORMAL
N. GONORRHOEAE DNA: NEGATIVE
SPECIMEN DESCRIPTION: ABNORMAL

## 2020-07-04 ENCOUNTER — TELEPHONE (OUTPATIENT)
Dept: PHARMACY | Age: 20
End: 2020-07-04

## 2020-07-04 NOTE — TELEPHONE ENCOUNTER
CLINICAL PHARMACY NOTE:  Documentation of review for Positive STD Test    At the time of Megan Mcneal's visit to [facility] Emergency Department on 7/1/2020 STD testing was performed. DNA testing was positive for Chlamydia. While in the ED, patient was given azithromycin 1gm orally x1 dose and ceftriaxone 250mg IM x 1 dose. Treatment appropriate, no follow up needed at this time.      Becky Peralta RPh, ANILP  Clinical Pharmacist Medication Management  7/4/2020  2:15 PM

## 2020-07-19 ENCOUNTER — APPOINTMENT (OUTPATIENT)
Dept: ULTRASOUND IMAGING | Age: 20
End: 2020-07-19
Payer: COMMERCIAL

## 2020-07-19 ENCOUNTER — HOSPITAL ENCOUNTER (EMERGENCY)
Age: 20
Discharge: HOME | End: 2020-07-19
Attending: EMERGENCY MEDICINE
Payer: COMMERCIAL

## 2020-07-19 VITALS
OXYGEN SATURATION: 98 % | TEMPERATURE: 98.1 F | DIASTOLIC BLOOD PRESSURE: 65 MMHG | SYSTOLIC BLOOD PRESSURE: 99 MMHG | RESPIRATION RATE: 16 BRPM | HEART RATE: 80 BPM

## 2020-07-19 LAB
-: ABNORMAL
AMORPHOUS: ABNORMAL
BACTERIA: ABNORMAL
BILIRUBIN URINE: NEGATIVE
CASTS UA: ABNORMAL /LPF (ref 0–8)
COLOR: YELLOW
CRYSTALS, UA: ABNORMAL /HPF
DIRECT EXAM: ABNORMAL
EPITHELIAL CELLS UA: ABNORMAL /HPF (ref 0–5)
GLUCOSE URINE: NEGATIVE
HCG QUANTITATIVE: <1 IU/L
KETONES, URINE: ABNORMAL
LEUKOCYTE ESTERASE, URINE: NEGATIVE
Lab: ABNORMAL
MUCUS: ABNORMAL
NITRITE, URINE: NEGATIVE
OTHER OBSERVATIONS UA: ABNORMAL
PH UA: 6 (ref 5–8)
PROTEIN UA: ABNORMAL
RBC UA: ABNORMAL /HPF (ref 0–4)
RENAL EPITHELIAL, UA: ABNORMAL /HPF
SPECIFIC GRAVITY UA: 1.03 (ref 1–1.03)
SPECIMEN DESCRIPTION: ABNORMAL
TRICHOMONAS: ABNORMAL
TURBIDITY: ABNORMAL
URINE HGB: NEGATIVE
UROBILINOGEN, URINE: NORMAL
WBC UA: ABNORMAL /HPF (ref 0–5)
YEAST: ABNORMAL

## 2020-07-19 PROCEDURE — 87591 N.GONORRHOEAE DNA AMP PROB: CPT

## 2020-07-19 PROCEDURE — 87491 CHLMYD TRACH DNA AMP PROBE: CPT

## 2020-07-19 PROCEDURE — 87510 GARDNER VAG DNA DIR PROBE: CPT

## 2020-07-19 PROCEDURE — 76830 TRANSVAGINAL US NON-OB: CPT

## 2020-07-19 PROCEDURE — 99284 EMERGENCY DEPT VISIT MOD MDM: CPT

## 2020-07-19 PROCEDURE — 87480 CANDIDA DNA DIR PROBE: CPT

## 2020-07-19 PROCEDURE — 87660 TRICHOMONAS VAGIN DIR PROBE: CPT

## 2020-07-19 PROCEDURE — 6370000000 HC RX 637 (ALT 250 FOR IP): Performed by: STUDENT IN AN ORGANIZED HEALTH CARE EDUCATION/TRAINING PROGRAM

## 2020-07-19 PROCEDURE — 81001 URINALYSIS AUTO W/SCOPE: CPT

## 2020-07-19 PROCEDURE — 84702 CHORIONIC GONADOTROPIN TEST: CPT

## 2020-07-19 RX ORDER — ONDANSETRON 4 MG/1
4 TABLET, ORALLY DISINTEGRATING ORAL ONCE
Status: COMPLETED | OUTPATIENT
Start: 2020-07-19 | End: 2020-07-19

## 2020-07-19 RX ORDER — ONDANSETRON 4 MG/1
4 TABLET, ORALLY DISINTEGRATING ORAL EVERY 8 HOURS PRN
Qty: 20 TABLET | Refills: 0 | Status: SHIPPED | OUTPATIENT
Start: 2020-07-19

## 2020-07-19 RX ORDER — CEPHALEXIN 500 MG/1
500 CAPSULE ORAL 4 TIMES DAILY
Qty: 40 CAPSULE | Refills: 0 | Status: SHIPPED | OUTPATIENT
Start: 2020-07-19 | End: 2020-07-29

## 2020-07-19 RX ORDER — METRONIDAZOLE 500 MG/1
500 TABLET ORAL 2 TIMES DAILY
Qty: 20 TABLET | Refills: 0 | Status: SHIPPED | OUTPATIENT
Start: 2020-07-19 | End: 2020-07-29

## 2020-07-19 RX ADMIN — ONDANSETRON 4 MG: 4 TABLET, ORALLY DISINTEGRATING ORAL at 12:25

## 2020-07-19 ASSESSMENT — ENCOUNTER SYMPTOMS
BLOOD IN STOOL: 0
FACIAL SWELLING: 0
WHEEZING: 0
DIARRHEA: 0
ABDOMINAL PAIN: 1
NAUSEA: 1
BACK PAIN: 0
SHORTNESS OF BREATH: 0
VOMITING: 0

## 2020-07-19 NOTE — ED PROVIDER NOTES
Smoking status: Never Smoker    Smokeless tobacco: Never Used   Substance and Sexual Activity    Alcohol use: No    Drug use: Yes     Types: Marijuana    Sexual activity: Never   Lifestyle    Physical activity     Days per week: Not on file     Minutes per session: Not on file    Stress: Not on file   Relationships    Social connections     Talks on phone: Not on file     Gets together: Not on file     Attends Spiritism service: Not on file     Active member of club or organization: Not on file     Attends meetings of clubs or organizations: Not on file     Relationship status: Not on file    Intimate partner violence     Fear of current or ex partner: Not on file     Emotionally abused: Not on file     Physically abused: Not on file     Forced sexual activity: Not on file   Other Topics Concern    Not on file   Social History Narrative    Not on file       History reviewed. No pertinent family history. Allergies:  Patient has no known allergies. Home Medications:  Prior to Admission medications    Medication Sig Start Date End Date Taking?  Authorizing Provider   cephALEXin (KEFLEX) 500 MG capsule Take 1 capsule by mouth 4 times daily for 10 days 7/19/20 7/29/20 Yes Debora Deleon DO   metroNIDAZOLE (FLAGYL) 500 MG tablet Take 1 tablet by mouth 2 times daily for 10 days 7/19/20 7/29/20 Yes Debora Deleon, DO   ondansetron (ZOFRAN ODT) 4 MG disintegrating tablet Take 1 tablet by mouth every 8 hours as needed for Nausea 7/19/20  Yes Debora Deleon DO   ibuprofen (ADVIL;MOTRIN) 800 MG tablet Take 800 mg by mouth every 6 hours as needed for Pain    Historical Provider, MD   Prenatal Vit-Fe Fumarate-FA (PRENATAL 1+1 PO) Take by mouth    Historical Provider, MD   docusate sodium (COLACE) 100 MG capsule Take 1 capsule by mouth daily 10/19/18   Carmina Vizcarra, APRN - CNP       REVIEW OF SYSTEMS    (2-9 systems for level 4, 10 or more for level 5)      Review of Systems   Constitutional: Positive for appetite change and fatigue. Negative for chills and fever. HENT: Negative for facial swelling. Eyes: Negative for visual disturbance. Respiratory: Negative for shortness of breath and wheezing. Cardiovascular: Negative for chest pain. Gastrointestinal: Positive for abdominal pain and nausea. Negative for blood in stool, diarrhea and vomiting. Endocrine: Negative for polyuria. Genitourinary: Positive for menstrual problem (Missed period). Negative for dysuria, flank pain, vaginal bleeding and vaginal discharge. Musculoskeletal: Negative for back pain. Neurological: Negative for syncope and headaches. Psychiatric/Behavioral: Negative for confusion. PHYSICAL EXAM   (up to 7 for level 4, 8 or more for level 5)      INITIAL VITALS:   BP 99/65   Pulse 80   Temp 98.1 °F (36.7 °C)   Resp 16   LMP 04/15/2020 (Approximate)   SpO2 98%   Breastfeeding No     Physical Exam  Exam conducted with a chaperone present. Constitutional:       General: She is not in acute distress. Appearance: She is not ill-appearing or toxic-appearing. HENT:      Head: Normocephalic and atraumatic. Eyes:      General:         Right eye: No discharge. Left eye: No discharge. Cardiovascular:      Rate and Rhythm: Normal rate. Pulmonary:      Effort: Pulmonary effort is normal.   Abdominal:      General: There is no distension. Tenderness: There is abdominal tenderness (LLQ, mild). There is no guarding or rebound. Genitourinary:     General: Normal vulva. Exam position: Supine. Labia:         Right: No rash, tenderness, lesion or injury. Left: No rash, tenderness, lesion or injury. Vagina: No signs of injury. Vaginal discharge (scant white) present. No erythema, tenderness, bleeding or lesions. Cervix: Normal.      Uterus: Normal.       Adnexa: Right adnexa normal.        Left: Tenderness present. No mass or fullness. Musculoskeletal:      Right lower leg: No edema. Left lower leg: No edema. Skin:     General: Skin is warm. Findings: No erythema or rash. Neurological:      Mental Status: She is alert. Gait: Gait normal.         DIFFERENTIAL  DIAGNOSIS     PLAN (LABS / IMAGING / EKG):  Orders Placed This Encounter   Procedures    C.trachomatis N.gonorrhoeae DNA    VAGINITIS DNA PROBE    US NON OB TRANSVAGINAL    Urinalysis with Microscopic    HCG, QUANTITATIVE, PREGNANCY    Vaginal exam       MEDICATIONS ORDERED:  Orders Placed This Encounter   Medications    ondansetron (ZOFRAN-ODT) disintegrating tablet 4 mg    cephALEXin (KEFLEX) 500 MG capsule     Sig: Take 1 capsule by mouth 4 times daily for 10 days     Dispense:  40 capsule     Refill:  0    metroNIDAZOLE (FLAGYL) 500 MG tablet     Sig: Take 1 tablet by mouth 2 times daily for 10 days     Dispense:  20 tablet     Refill:  0    ondansetron (ZOFRAN ODT) 4 MG disintegrating tablet     Sig: Take 1 tablet by mouth every 8 hours as needed for Nausea     Dispense:  20 tablet     Refill:  0       DDX: Pregnancy, gastritis, ectopic pregnancy, ovarian torsion, adnexal mass, gonorrhea, chlamydia, bacterial vaginosis, acute cystitis, ovarian cyst    DIAGNOSTIC RESULTS / EMERGENCY DEPARTMENT COURSE / MDM   LAB RESULTS:  Results for orders placed or performed during the hospital encounter of 07/19/20   VAGINITIS DNA PROBE    Specimen: Vaginal   Result Value Ref Range    Specimen Description . VAGINA     Special Requests NOT REPORTED     Direct Exam POSITIVE for Gardnerella vaginalis. (A)     Direct Exam NEGATIVE for Candida sp. Direct Exam NEGATIVE for Trichomonas vaginalis     Direct Exam       Method of testing is a DNA probe intended for detection and identification of Candida species, Gardnerella vaginalis, and Trichomonas vaginalis nucleic acid in vaginal fluid specimens from patients with symptoms of vaginitis/vaginosis.    Urinalysis with Microscopic   Result Value Ref Range    Color, UA YELLOW YELLOW    Turbidity UA CLOUDY (A) CLEAR    Glucose, Ur NEGATIVE NEGATIVE    Bilirubin Urine NEGATIVE NEGATIVE    Ketones, Urine MODERATE (A) NEGATIVE    Specific Gravity, UA 1.034 (H) 1.005 - 1.030    Urine Hgb NEGATIVE NEGATIVE    pH, UA 6.0 5.0 - 8.0    Protein, UA TRACE (A) NEGATIVE    Urobilinogen, Urine Normal Normal    Nitrite, Urine NEGATIVE NEGATIVE    Leukocyte Esterase, Urine NEGATIVE NEGATIVE    -          WBC, UA 10 TO 20 0 - 5 /HPF    RBC, UA 0 TO 2 0 - 4 /HPF    Casts UA  0 - 8 /LPF     20 TO 50 Reference range defined for non-centrifuged specimen. Crystals, UA NOT REPORTED None /HPF    Epithelial Cells UA 2 TO 5 0 - 5 /HPF    Renal Epithelial, UA NOT REPORTED 0 /HPF    Bacteria, UA FEW (A) None    Mucus, UA NOT REPORTED None    Trichomonas, UA NOT REPORTED None    Amorphous, UA NOT REPORTED None    Other Observations UA NOT REPORTED NOT REQ. Yeast, UA NOT REPORTED None   HCG, QUANTITATIVE, PREGNANCY   Result Value Ref Range    hCG Quant <1 <5 IU/L       IMPRESSION: 56-year-old female no acute distress presenting with nausea, left lower quadrant abdominal pain and concerns for possible pregnancy. Plan for pregnancy test, urinalysis and pelvic exam with STD testing/vaginitis probe. Will treat symptomatically for nausea with Zofran ODT. Plan for transvaginal ultrasound rule out ectopic pregnancy pending results of serum hCG quant. RADIOLOGY:  Us Non Ob Transvaginal    Result Date: 7/19/2020  EXAMINATION: PELVIC ULTRASOUND 7/19/2020 TECHNIQUE: Transvaginal pelvic ultrasound was performed. Color Doppler evaluation was performed. COMPARISON: CT abdomen and pelvis April 14, 2019 HISTORY: ORDERING SYSTEM PROVIDED HISTORY: Left adnexal tenderness and nausea TECHNOLOGIST PROVIDED HISTORY: Left adnexal tenderness and nausea FINDINGS: Uterus measures 9.1 x 3.5 x 3.7 cm. No uterine lesion. Minimal amount of nonspecific fluid within the cervix. Endometrial thickness is normal measuring 9.3 mm.  Right ovary measures 3.9 x 2.0 x 3.0 cm. Left ovary measures 2.8 x 1.3 x 3.1 cm. No ovarian lesion. Bilateral ovarian blood flow is demonstrated. No significant free fluid within the pelvis. No evidence of ovarian torsion. EMERGENCY DEPARTMENT COURSE:  Evaluated bedside, no acute distress, vital signs stable and afebrile. Patient with complaints of left lower quadrant abdominal pain. Physical exam findings as above. hCG quant negative. Patient with evidence of UTI on urinalysis. Found to have bacterial vaginosis on vaginitis probe. Transvaginal ultrasound with out any evidence of ovarian torsion or other adnexal lesions. Patient discharged home with instructions to follow-up with her primary care physician. Given prescription for p.o. Keflex and p.o. Flagyl. Given strict ED return precautions. She verbalized understanding of and agreement with the discharge plan  PROCEDURES:  None    CONSULTS:  None    CRITICAL CARE:  Please see attending note    FINAL IMPRESSION      1. Acute cystitis without hematuria    2. Bacterial vaginosis    3.  Nausea          DISPOSITION / PLAN     DISPOSITION Decision To Discharge 07/19/2020 03:20:26 PM      PATIENT REFERRED TO:  [facility]  [address] [place] [address]  [phone]  Go to   As needed, If symptoms worsen      DISCHARGE MEDICATIONS:  Discharge Medication List as of 7/19/2020  3:22 PM      START taking these medications    Details   cephALEXin (KEFLEX) 500 MG capsule Take 1 capsule by mouth 4 times daily for 10 days, Disp-40 capsule,R-0Print      metroNIDAZOLE (FLAGYL) 500 MG tablet Take 1 tablet by mouth 2 times daily for 10 days, Disp-20 tablet,R-0Print      ondansetron (ZOFRAN ODT) 4 MG disintegrating tablet Take 1 tablet by mouth every 8 hours as needed for Nausea, Disp-20 tablet,R-0Print             Reece Griffiths DO  Emergency Medicine Resident    (Please note that portions of thisnote were completed with a voice recognition program.  Efforts were made to edit the dictations but occasionally words are mis-transcribed.)        Luis Landeros DO  Resident  07/19/20 2774

## 2020-07-19 NOTE — ED NOTES
Returned from [facility], [facility]  07/19/20 8409

## 2020-07-19 NOTE — ED PROVIDER NOTES
[address] [facility]     Emergency Department     Faculty Attestation    I performed a history and physical examination of the patient and discussed management with the resident. I reviewed the resident´s note and agree with the documented findings and plan of care. Any areas of disagreement are noted on the chart. I was personally present for the key portions of any procedures. I have documented in the chart those procedures where I was not present during the key portions. I have reviewed the emergency nurses triage note. I agree with the chief complaint, past medical history, past surgical history, allergies, medications, social and family history as documented unless otherwise noted below. For Physician Assistant/ Nurse Practitioner cases/documentation I have personally evaluated this patient and have completed at least one if not all key elements of the E/M (history, physical exam, and MDM). Additional findings are as noted. Mild left adnexal pain, patient thinks she may be pregnant.   Blood type is A-.     Boo Tamayo MD  07/19/20 1232       Boo Tamayo MD  07/19/20 1232

## 2020-08-03 ENCOUNTER — HOSPITAL ENCOUNTER (EMERGENCY)
Age: 20
Discharge: LEFT BEFORE DISCHARGE | End: 2020-08-03
Payer: COMMERCIAL

## 2020-08-03 VITALS
TEMPERATURE: 97.9 F | OXYGEN SATURATION: 98 % | WEIGHT: 106 LBS | DIASTOLIC BLOOD PRESSURE: 72 MMHG | SYSTOLIC BLOOD PRESSURE: 105 MMHG | HEART RATE: 82 BPM | BODY MASS INDEX: 22.25 KG/M2 | RESPIRATION RATE: 16 BRPM | HEIGHT: 58 IN

## 2021-02-16 ENCOUNTER — HOSPITAL ENCOUNTER (EMERGENCY)
Age: 21
Discharge: HOME | End: 2021-02-16
Attending: EMERGENCY MEDICINE
Payer: COMMERCIAL

## 2021-02-16 VITALS
OXYGEN SATURATION: 100 % | WEIGHT: 108 LBS | HEART RATE: 77 BPM | BODY MASS INDEX: 22.67 KG/M2 | RESPIRATION RATE: 18 BRPM | DIASTOLIC BLOOD PRESSURE: 72 MMHG | TEMPERATURE: 97.7 F | SYSTOLIC BLOOD PRESSURE: 137 MMHG | HEIGHT: 58 IN

## 2021-02-16 DIAGNOSIS — N76.0 BV (BACTERIAL VAGINOSIS): Primary | ICD-10-CM

## 2021-02-16 DIAGNOSIS — B96.89 BV (BACTERIAL VAGINOSIS): Primary | ICD-10-CM

## 2021-02-16 LAB
BILIRUBIN URINE: NEGATIVE
COLOR: YELLOW
COMMENT UA: ABNORMAL
DIRECT EXAM: ABNORMAL
GLUCOSE URINE: NEGATIVE
HCG(URINE) PREGNANCY TEST: NEGATIVE
KETONES, URINE: NEGATIVE
LEUKOCYTE ESTERASE, URINE: NEGATIVE
Lab: ABNORMAL
NITRITE, URINE: NEGATIVE
PH UA: 8.5 (ref 5–8)
PROTEIN UA: NEGATIVE
SPECIFIC GRAVITY UA: 1.01 (ref 1–1.03)
SPECIMEN DESCRIPTION: ABNORMAL
TURBIDITY: CLEAR
URINE HGB: NEGATIVE
UROBILINOGEN, URINE: NORMAL

## 2021-02-16 PROCEDURE — 81025 URINE PREGNANCY TEST: CPT

## 2021-02-16 PROCEDURE — 87480 CANDIDA DNA DIR PROBE: CPT

## 2021-02-16 PROCEDURE — 87510 GARDNER VAG DNA DIR PROBE: CPT

## 2021-02-16 PROCEDURE — 99283 EMERGENCY DEPT VISIT LOW MDM: CPT

## 2021-02-16 PROCEDURE — 81003 URINALYSIS AUTO W/O SCOPE: CPT

## 2021-02-16 PROCEDURE — 87491 CHLMYD TRACH DNA AMP PROBE: CPT

## 2021-02-16 PROCEDURE — 87591 N.GONORRHOEAE DNA AMP PROB: CPT

## 2021-02-16 PROCEDURE — 87660 TRICHOMONAS VAGIN DIR PROBE: CPT

## 2021-02-16 RX ORDER — METRONIDAZOLE 500 MG/1
500 TABLET ORAL 2 TIMES DAILY
Qty: 14 TABLET | Refills: 0 | Status: SHIPPED | OUTPATIENT
Start: 2021-02-16 | End: 2021-02-23

## 2021-02-16 ASSESSMENT — ENCOUNTER SYMPTOMS
EYE DISCHARGE: 0
VOMITING: 0
SHORTNESS OF BREATH: 0
DIARRHEA: 0
EYE REDNESS: 0
NAUSEA: 0
ABDOMINAL DISTENTION: 1
BACK PAIN: 0

## 2021-02-16 NOTE — ED PROVIDER NOTES
[address] [facility]     Emergency Department     Faculty Attestation    I performed a history and physical examination of the patient and discussed management with the resident. I have reviewed and agree with the residents findings including all diagnostic interpretations, and treatment plans as written at the time of my review. Any areas of disagreement are noted on the chart. I was personally present for the key portions of any procedures. I have documented in the chart those procedures where I was not present during the key portions. For Physician Assistant/ Nurse Practitioner cases/documentation I have personally evaluated this patient and have completed at least one if not all key elements of the E/M (history, physical exam, and MDM). Additional findings are as noted. This patient was evaluated in the Emergency Department for symptoms described in the history of present illness. The patient was evaluated in the context of the global COVID-19 pandemic, which necessitated consideration that the patient might be at risk for infection with the SARS-CoV-2 virus that causes COVID-19. Institutional protocols and algorithms that pertain to the evaluation of patients at risk for COVID-19 are in a state of rapid change based on information released by regulatory bodies including the CDC and federal and state organizations. These policies and algorithms were followed during the patient's care in the ED. Primary Care Physician: No primary care provider on file. History: This is a 24 y.o. female who presents to the Emergency Department with complaint of pressure in her lower abdominal area. Is been ongoing for last week. She denies any dysuria, frequency urgency. She denies any vaginal bleeding or discharge. The patient dates last menstrual period was the end of last month. Physical:   height is 4' 10\" (1.473 m) and weight is 108 lb (49 kg).  Her infrared temperature is 97.7 °F (36.5 °C). Her blood pressure is 137/72 and her pulse is 77. Her respiration is 18 and oxygen saturation is 100%. Abdomen is soft she has some minimal lower tenderness is no guarding no rebound no pulsatile abdominal mass    Impression: Lower abdominal discomfort    Plan: Pelvic labs, urinalysis, UCG      (Please note that portions of this note were completed with a voice recognition program.  Efforts were made to edit the dictations but occasionally words are mis-transcribed.)    Luis Schultz.  Nevin Perez MD, [facility]  Attending Emergency Medicine Physician        Shabnam Manuel MD  02/16/21 2706

## 2021-02-16 NOTE — ED PROVIDER NOTES
[facility]  ED  Emergency Department Encounter  Emergency Medicine Resident     Pt Name: Kellee Middleton  MRN: [id number]  [place] 2000  Date of evaluation: 21  PCP:  No primary care provider on file. CHIEF COMPLAINT       Chief Complaint   Patient presents with    Pregnancy Test     LMP 21 but states she has pressure in her abdomen, took 2 neg tests at home       HISTORY OFPRESENT ILLNESS  (Location/Symptom, Timing/Onset, Context/Setting, Quality, Duration, Modifying Factors,Severity.)      Kellee Middleton is a 24 y.o. female who presents with concern for pregnancy. Last menstrual period was last week of January. Patient took 2 - pregnancy tests at home. Still concerned that she may be pregnant. States she has had some suprapubic and lower abdomen pressure and extremely mild pain. Worse with palpation. Worse with movement. Denies any chest pain, fevers, shortness of breath back pain. PAST MEDICAL / SURGICAL / SOCIAL / FAMILY HISTORY      has a past medical history of Post partum depression. has a past surgical history that includes  section (2019).     Social History     Socioeconomic History    Marital status: Single     Spouse name: Not on file    Number of children: Not on file    Years of education: Not on file    Highest education level: Not on file   Occupational History    Not on file   Social Needs    Financial resource strain: Not on file    Food insecurity     Worry: Not on file     Inability: Not on file    Transportation needs     Medical: Not on file     Non-medical: Not on file   Tobacco Use    Smoking status: Never Smoker    Smokeless tobacco: Never Used   Substance and Sexual Activity    Alcohol use: No    Drug use: Yes     Types: Marijuana     Comment: daily     Sexual activity: Never   Lifestyle    Physical activity     Days per week: Not on file     Minutes per session: Not on file    Stress: Not on file   Relationships    Social connections     Talks on phone: Not on file     Gets together: Not on file     Attends Judaism service: Not on file     Active member of club or organization: Not on file     Attends meetings of clubs or organizations: Not on file     Relationship status: Not on file    Intimate partner violence     Fear of current or ex partner: Not on file     Emotionally abused: Not on file     Physically abused: Not on file     Forced sexual activity: Not on file   Other Topics Concern    Not on file   Social History Narrative    Not on file       History reviewed. No pertinent family history. Allergies:  Patient has no known allergies. Home Medications:  Prior to Admission medications    Medication Sig Start Date End Date Taking? Authorizing Provider   metroNIDAZOLE (FLAGYL) 500 MG tablet Take 1 tablet by mouth 2 times daily for 7 days 2/16/21 2/23/21 Yes Arna Fling, DO   ondansetron (ZOFRAN ODT) 4 MG disintegrating tablet Take 1 tablet by mouth every 8 hours as needed for Nausea 7/19/20   Novoa Show, DO   ibuprofen (ADVIL;MOTRIN) 800 MG tablet Take 800 mg by mouth every 6 hours as needed for Pain    Historical Provider, MD   Prenatal Vit-Fe Fumarate-FA (PRENATAL 1+1 PO) Take by mouth    Historical Provider, MD   docusate sodium (COLACE) 100 MG capsule Take 1 capsule by mouth daily 10/19/18   Bonnie Galvan APRN - CNP       REVIEW OF SYSTEMS    (2-9 systems for level 4, 10 or more for level 5)      Review of Systems   Constitutional: Negative for chills and fever. Eyes: Negative for discharge and redness. Respiratory: Negative for shortness of breath. Cardiovascular: Negative for chest pain. Gastrointestinal: Positive for abdominal distention. Negative for diarrhea, nausea and vomiting. Genitourinary: Negative for flank pain. Musculoskeletal: Negative for back pain. Skin: Negative for rash. Allergic/Immunologic: Negative for environmental allergies.    Neurological: Negative for headaches. Psychiatric/Behavioral: Negative for agitation and confusion. PHYSICAL EXAM   (up to 7 for level 4, 8 or more for level 5)     INITIAL VITALS:    height is 4' 10\" (1.473 m) and weight is 108 lb (49 kg). Her infrared temperature is 97.7 °F (36.5 °C). Her blood pressure is 137/72 and her pulse is 77. Her respiration is 18 and oxygen saturation is 100%. Physical Exam  Vitals signs and nursing note reviewed. Constitutional:       Appearance: She is well-developed. HENT:      Head: Normocephalic and atraumatic. Nose: Nose normal.      Mouth/Throat:      Mouth: Mucous membranes are moist.   Eyes:      General: No scleral icterus. Conjunctiva/sclera: Conjunctivae normal.      Pupils: Pupils are equal, round, and reactive to light. Neck:      Musculoskeletal: Neck supple. Trachea: No tracheal deviation. Cardiovascular:      Rate and Rhythm: Normal rate and regular rhythm. Heart sounds: Normal heart sounds. No murmur. No friction rub. No gallop. Pulmonary:      Effort: Pulmonary effort is normal. No respiratory distress. Breath sounds: Normal breath sounds. No wheezing or rales. Abdominal:      General: Bowel sounds are normal. There is no distension. Palpations: Abdomen is soft. There is no mass. Tenderness: There is abdominal tenderness. There is no guarding or rebound. Comments: Very slight tenderness with deep palpation to suprapubic and right lower quadrant. No guarding, nonperitoneal, no masses, no rebound. Genitourinary:     Comments: External appearing genitalia with no rashes or vesicular lesions. Some whitish discharge noted on vaginal exam.  No pain on bimanual exam.  No active bleeding. Os closed. Musculoskeletal: Normal range of motion. Skin:     General: Skin is warm and dry. Findings: No erythema or rash. Neurological:      Mental Status: She is alert and oriented to person, place, and time.    Psychiatric: Behavior: Behavior normal.         DIFFERENTIAL  DIAGNOSIS     PLAN (LABS / IMAGING / EKG):  Orders Placed This Encounter   Procedures    VAGINITIS DNA PROBE    C.trachomatis N.gonorrhoeae DNA    Urinalysis Reflex to Culture    PREGNANCY, URINE    Vaginal exam       MEDICATIONS ORDERED:  Orders Placed This Encounter   Medications    metroNIDAZOLE (FLAGYL) 500 MG tablet     Sig: Take 1 tablet by mouth 2 times daily for 7 days     Dispense:  14 tablet     Refill:  0       DDX: BV versus STD versus pregnancy versus UTI    Initial MDM/Plan: 24 y.o. female who presents with concern for pregnancy. I did have extensive discussion with patient that this her last period was the most 3 weeks ago she could still be pregnant however is extremely early and might not be detected on urine testing. Bimanual exam with no tenderness to right lower quadrant. Low suspicion for appendicitis or serious ovarian pathology such as ovarian torsion. Will check urine, pregnancy, GC chlamydia probe, vaginitis. DIAGNOSTIC RESULTS / EMERGENCY DEPARTMENT COURSE / MDM     LABS:  Labs Reviewed   VAGINITIS DNA PROBE - Abnormal; Notable for the following components:       Result Value    Direct Exam POSITIVE for Gardnerella vaginalis. (*)     All other components within normal limits   URINE RT REFLEX TO CULTURE - Abnormal; Notable for the following components:    pH, UA 8.5 (*)     All other components within normal limits   C.TRACHOMATIS N.GONORRHOEAE DNA   PREGNANCY, URINE         RADIOLOGY:  No results found. EMERGENCY DEPARTMENT COURSE:  Normal urinalysis and vaginitis probe positive for BV. Urine pregnancy negative. I reiterated that if patient misses her next. She should repeat a urine pregnancy test.  Suspicion for appendicitis or any serious intra-abdominal pathology or ovarian torsion. No pain on bimanual exam.  Will discharge and encourage close follow-up with OB/GYN.     · Based on the low acuity of concerning symptoms and improvement of symptoms, patient will be discharged with follow up and prescription information listed in the Disposition section. · Patient states they will follow-up with primary care physician and/or return to the emergency department should they experience a change or worsening of symptoms. · Patient will be discharged with resources: summary of visit, instructions, follow-up information, prescriptions if necessary and clinics available. · Patient/ family instructed to read discharge paperwork. All of their questions and concerns were addressed. · Suspicion for any acute life-threatening processes is low. Patient voices understanding of plan. PROCEDURES:  None    CONSULTS:  None    CRITICAL CARE:  Please see attending note    FINAL IMPRESSION      1. BV (bacterial vaginosis)          DISPOSITION / PLAN     DISPOSITION          PATIENTREFERRED TO:  No follow-up provider specified.     DISCHARGE MEDICATIONS:  Discharge Medication List as of 2/16/2021  1:37 PM      START taking these medications    Details   metroNIDAZOLE (FLAGYL) 500 MG tablet Take 1 tablet by mouth 2 times daily for 7 days, Disp-14 tablet, R-0Print             Pavan Lopez DO  EmergencyMedicine Resident    (Please note that portions of this note were completed with a voice recognition program.  Efforts were made to edit the dictations but occasionally words are mis-transcribed.)       Pavan Lopez DO  Resident  02/17/21 1544

## 2021-02-16 NOTE — ED NOTES
Pt presents to the ED with c/o of wanting a pregnancy test. Pt states her LMP was at the beginning of the month. Pt states she has taken two negative pregnancy tests at home and believes she is pregnant because she is experiencing lower abdominal cramping. Pt denies any other complaints.       Naren Mcdonough  02/16/21 2122

## 2021-08-02 ENCOUNTER — APPOINTMENT (OUTPATIENT)
Dept: GENERAL RADIOLOGY | Age: 21
End: 2021-08-02
Payer: COMMERCIAL

## 2021-08-02 ENCOUNTER — HOSPITAL ENCOUNTER (EMERGENCY)
Age: 21
Discharge: HOME | End: 2021-08-02
Attending: EMERGENCY MEDICINE
Payer: COMMERCIAL

## 2021-08-02 VITALS
SYSTOLIC BLOOD PRESSURE: 112 MMHG | OXYGEN SATURATION: 98 % | TEMPERATURE: 97.1 F | DIASTOLIC BLOOD PRESSURE: 83 MMHG | BODY MASS INDEX: 22.99 KG/M2 | WEIGHT: 110 LBS | HEART RATE: 78 BPM | RESPIRATION RATE: 14 BRPM

## 2021-08-02 DIAGNOSIS — K59.00 CONSTIPATION, UNSPECIFIED CONSTIPATION TYPE: Primary | ICD-10-CM

## 2021-08-02 DIAGNOSIS — N30.00 ACUTE CYSTITIS WITHOUT HEMATURIA: ICD-10-CM

## 2021-08-02 DIAGNOSIS — D69.6 THROMBOCYTOPENIA (HCC): ICD-10-CM

## 2021-08-02 LAB
-: ABNORMAL
ABSOLUTE EOS #: 0 K/UL (ref 0–0.4)
ABSOLUTE IMMATURE GRANULOCYTE: ABNORMAL K/UL (ref 0–0.3)
ABSOLUTE LYMPH #: 1.3 K/UL (ref 1–4.8)
ABSOLUTE MONO #: 0.4 K/UL (ref 0.1–1.3)
AMORPHOUS: ABNORMAL
ANION GAP SERPL CALCULATED.3IONS-SCNC: 10 MMOL/L (ref 9–17)
BACTERIA: ABNORMAL
BASOPHILS # BLD: 0 % (ref 0–2)
BASOPHILS ABSOLUTE: 0 K/UL (ref 0–0.2)
BILIRUBIN URINE: NEGATIVE
BUN BLDV-MCNC: 9 MG/DL (ref 6–20)
BUN/CREAT BLD: NORMAL (ref 9–20)
CALCIUM SERPL-MCNC: 8.9 MG/DL (ref 8.6–10.4)
CASTS UA: ABNORMAL /LPF
CHLORIDE BLD-SCNC: 106 MMOL/L (ref 98–107)
CO2: 22 MMOL/L (ref 20–31)
COLOR: YELLOW
COMMENT UA: ABNORMAL
CREAT SERPL-MCNC: 0.67 MG/DL (ref 0.5–0.9)
CRYSTALS, UA: ABNORMAL /HPF
DIFFERENTIAL TYPE: ABNORMAL
EOSINOPHILS RELATIVE PERCENT: 1 % (ref 0–4)
EPITHELIAL CELLS UA: ABNORMAL /HPF
GFR AFRICAN AMERICAN: >60 ML/MIN
GFR NON-AFRICAN AMERICAN: >60 ML/MIN
GFR SERPL CREATININE-BSD FRML MDRD: NORMAL ML/MIN/{1.73_M2}
GFR SERPL CREATININE-BSD FRML MDRD: NORMAL ML/MIN/{1.73_M2}
GLUCOSE BLD-MCNC: 88 MG/DL (ref 70–99)
GLUCOSE URINE: NEGATIVE
HCG(URINE) PREGNANCY TEST: NEGATIVE
HCT VFR BLD CALC: 34.3 % (ref 36–46)
HEMOGLOBIN: 11.2 G/DL (ref 12–16)
IMMATURE GRANULOCYTES: ABNORMAL %
KETONES, URINE: ABNORMAL
LEUKOCYTE ESTERASE, URINE: NEGATIVE
LIPASE: 16 U/L (ref 13–60)
LYMPHOCYTES # BLD: 31 % (ref 24–44)
MCH RBC QN AUTO: 27.9 PG (ref 26–34)
MCHC RBC AUTO-ENTMCNC: 32.5 G/DL (ref 31–37)
MCV RBC AUTO: 85.7 FL (ref 80–100)
MONOCYTES # BLD: 9 % (ref 1–7)
MUCUS: ABNORMAL
NITRITE, URINE: NEGATIVE
NRBC AUTOMATED: ABNORMAL PER 100 WBC
OTHER OBSERVATIONS UA: ABNORMAL
PDW BLD-RTO: 14.8 % (ref 11.5–14.9)
PH UA: 5.5 (ref 5–8)
PLATELET # BLD: 128 K/UL (ref 150–450)
PLATELET ESTIMATE: ABNORMAL
PMV BLD AUTO: 9.2 FL (ref 6–12)
POTASSIUM SERPL-SCNC: 4.1 MMOL/L (ref 3.7–5.3)
PROTEIN UA: NEGATIVE
RBC # BLD: 4.01 M/UL (ref 4–5.2)
RBC # BLD: ABNORMAL 10*6/UL
RBC UA: ABNORMAL /HPF
RENAL EPITHELIAL, UA: ABNORMAL /HPF
SEG NEUTROPHILS: 59 % (ref 36–66)
SEGMENTED NEUTROPHILS ABSOLUTE COUNT: 2.4 K/UL (ref 1.3–9.1)
SODIUM BLD-SCNC: 138 MMOL/L (ref 135–144)
SPECIFIC GRAVITY UA: 1.02 (ref 1–1.03)
TRICHOMONAS: ABNORMAL
TURBIDITY: ABNORMAL
URINE HGB: NEGATIVE
UROBILINOGEN, URINE: NORMAL
WBC # BLD: 4.1 K/UL (ref 3.5–11)
WBC # BLD: ABNORMAL 10*3/UL
WBC UA: ABNORMAL /HPF
YEAST: ABNORMAL

## 2021-08-02 PROCEDURE — 74022 RADEX COMPL AQT ABD SERIES: CPT

## 2021-08-02 PROCEDURE — 36415 COLL VENOUS BLD VENIPUNCTURE: CPT

## 2021-08-02 PROCEDURE — 99284 EMERGENCY DEPT VISIT MOD MDM: CPT

## 2021-08-02 PROCEDURE — 81001 URINALYSIS AUTO W/SCOPE: CPT

## 2021-08-02 PROCEDURE — 83690 ASSAY OF LIPASE: CPT

## 2021-08-02 PROCEDURE — 81025 URINE PREGNANCY TEST: CPT

## 2021-08-02 PROCEDURE — 80048 BASIC METABOLIC PNL TOTAL CA: CPT

## 2021-08-02 PROCEDURE — 85025 COMPLETE CBC W/AUTO DIFF WBC: CPT

## 2021-08-02 RX ORDER — CEPHALEXIN 500 MG/1
500 CAPSULE ORAL 4 TIMES DAILY
Qty: 20 CAPSULE | Refills: 0 | Status: SHIPPED | OUTPATIENT
Start: 2021-08-02 | End: 2021-08-07

## 2021-08-02 RX ORDER — POLYETHYLENE GLYCOL 3350 17 G/17G
17 POWDER, FOR SOLUTION ORAL DAILY PRN
Qty: 510 G | Refills: 0 | Status: SHIPPED | OUTPATIENT
Start: 2021-08-02 | End: 2021-09-01

## 2021-08-02 ASSESSMENT — ENCOUNTER SYMPTOMS
VOMITING: 0
NAUSEA: 0
CONSTIPATION: 1
SHORTNESS OF BREATH: 0
ABDOMINAL PAIN: 1

## 2021-08-02 ASSESSMENT — PAIN SCALES - GENERAL: PAINLEVEL_OUTOF10: 8

## 2021-08-02 NOTE — ED PROVIDER NOTES
[address]  EMERGENCY DEPARTMENT ENCOUNTER      Pt Name: Raina Monroe  MRN: [id number]  [place] 2000  Date of evaluation: 21      CHIEF COMPLAINT       Chief Complaint   Patient presents with    Abdominal Pain     HISTORY OF PRESENT ILLNESS   HPI 24 y.o. female presents with c/o abdominal pain. Symptoms started 2 years ago. Pain is described as sharp / punching in character, severe in severity (rating it 8 / 10). Symptoms associated with constipation. Pt is taking MVI with iron. The pain is located primarily between the umbilicus and epigastric area  with no radiation. Over the last 3 days The pain has been constant in course. The patient tried colace prior to arrival with no relief of symptoms. REVIEW OF SYSTEMS       Review of Systems   Constitutional: Negative for fever. HENT: Negative for congestion. Eyes: Negative for visual disturbance. Respiratory: Negative for shortness of breath. Cardiovascular: Negative for chest pain. Gastrointestinal: Positive for abdominal pain and constipation. Negative for nausea and vomiting. Genitourinary: Negative for difficulty urinating. Musculoskeletal: Negative for arthralgias. Skin: Negative for rash.        PAST MEDICAL HISTORY     Past Medical History:   Diagnosis Date    Post partum depression        SURGICAL HISTORY       Past Surgical History:   Procedure Laterality Date     SECTION  2019       CURRENT MEDICATIONS       Discharge Medication List as of 2021  3:02 PM      CONTINUE these medications which have NOT CHANGED    Details   ondansetron (ZOFRAN ODT) 4 MG disintegrating tablet Take 1 tablet by mouth every 8 hours as needed for Nausea, Disp-20 tablet,R-0Print      ibuprofen (ADVIL;MOTRIN) 800 MG tablet Take 800 mg by mouth every 6 hours as needed for PainHistorical Med      Prenatal Vit-Fe Fumarate-FA (PRENATAL 1+1 PO) Take by mouthHistorical Med      docusate sodium (COLACE) 100 MG capsule Take 1 capsule by mouth daily, Disp-14 capsule, R-0Print           ALLERGIES     has No Known Allergies. FAMILY HISTORY     has no family status information on file. SOCIAL HISTORY      reports that she has never smoked. She has never used smokeless tobacco. She reports current drug use. Drug: Marijuana. She reports that she does not drink alcohol. PHYSICAL EXAM     INITIAL VITALS: /83   Pulse 78   Temp 97.1 °F (36.2 °C)   Resp 14   Wt 110 lb (49.9 kg)   LMP 07/06/2021   SpO2 98%   BMI 22.99 kg/m²   Gen: NAD  Head: Normocephalic, atraumatic  Eye: Pupils equal round reactive to light, no conjunctivitis  ENT: MMM  Neck: No adenopathy  Heart: Regular rate and rhythm no murmurs  Lungs: Clear to auscultation bilaterally, no respiratory distress  Chest wall: No crepitus, no tenderness palpation  Abdomen: Soft, nontender, nondistended, with no peritoneal signs  MSK: No CVA TTP  Neurologic: Patient is alert and oriented x3, fluent speech  Extremities: No rash or edema. MEDICAL DECISION MAKING:     MDM  24 y.o. female presenting with chronic abdominal pain and constipation. She is well-appearing. Her abdomen is soft. There is no tenderness. I doubt any appendicitis diverticulitis colitis cholecystitis etc.  Will get a acute abdominal series. I am also going to check her hemoglobin and electrolytes just to make sure she is not severely anemic before we stop her iron as I think this might be contributing to her problems. We will make sure there is no UTI or pregnancy. Emergency Department course:    UA shows moderate bacteriuria. Laboraotry studies and xray unremarkable. I think that the patient's abdominal discomfort and constipation are likely from the iron supplementation. Advised to hold iron supplement. Giving miralax. D/w pt the results, treatment plan, warning precautions for prompt ED return and importance of close OP FU, she verbalizes understanding and agrees with the treatment plan. DIAGNOSTIC RESULTS     RADIOLOGY:All plain film, CT, MRI, and formal ultrasound images (except ED bedside ultrasound) are read by the radiologist and the images and interpretations are directly viewed by the emergency physician. XR ACUTE ABD SERIES CHEST 1 VW   Final Result   Nonobstructive bowel gas pattern. No acute cardiopulmonary process. LABS: All lab results were reviewed by myself, and all abnormals are listed below. Labs Reviewed   URINE RT REFLEX TO CULTURE - Abnormal; Notable for the following components:       Result Value    Turbidity UA CLOUDY (*)     Ketones, Urine SMALL (*)     All other components within normal limits   CBC WITH AUTO DIFFERENTIAL - Abnormal; Notable for the following components:    Hemoglobin 11.2 (*)     Hematocrit 34.3 (*)     Platelets 169 (*)     Monocytes 9 (*)     All other components within normal limits   MICROSCOPIC URINALYSIS - Abnormal; Notable for the following components:    Bacteria, UA MODERATE (*)     Mucus, UA 1+ (*)     All other components within normal limits   PREGNANCY, URINE   BASIC METABOLIC PANEL   LIPASE       EMERGENCY DEPARTMENT COURSE:   Vitals:    Vitals:    08/02/21 1321   BP: 112/83   Pulse: 78   Resp: 14   Temp: 97.1 °F (36.2 °C)   SpO2: 98%   Weight: 110 lb (49.9 kg)       The patient was given the following medications while in the emergency department:  Orders Placed This Encounter   Medications    polyethylene glycol (GLYCOLAX) 17 GM/SCOOP powder     Sig: Take 17 g by mouth daily as needed (constipation)     Dispense:  510 g     Refill:  0    cephALEXin (KEFLEX) 500 MG capsule     Sig: Take 1 capsule by mouth 4 times daily for 5 days     Dispense:  20 capsule     Refill:  0     -------------------------  CRITICAL CARE:   CONSULTS: None  PROCEDURES: Procedures     FINAL IMPRESSION      1. Constipation, unspecified constipation type    2. Acute cystitis without hematuria    3.  Thrombocytopenia ([address] DISPOSITION/PLAN   DISPOSITION Decision To Discharge 08/02/2021 03:02:18 PM      PATIENT REFERRED TO:  Your Primary Care Provider    In 1 week      [facility] ED  Brodie Kowalski  [phone]          DISCHARGE MEDICATIONS:  Discharge Medication List as of 8/2/2021  3:02 PM      START taking these medications    Details   polyethylene glycol (GLYCOLAX) 17 GM/SCOOP powder Take 17 g by mouth daily as needed (constipation), Disp-510 g, R-0Print      cephALEXin (KEFLEX) 500 MG capsule Take 1 capsule by mouth 4 times daily for 5 days, Disp-20 capsule, R-0Print               Waqas West MD  Attending Emergency Physician                      Waqas West MD  08/03/21 4336

## 2022-03-20 ENCOUNTER — HOSPITAL ENCOUNTER (EMERGENCY)
Age: 22
Discharge: HOME | End: 2022-03-20
Attending: EMERGENCY MEDICINE
Payer: COMMERCIAL

## 2022-03-20 VITALS
HEART RATE: 94 BPM | DIASTOLIC BLOOD PRESSURE: 59 MMHG | SYSTOLIC BLOOD PRESSURE: 92 MMHG | TEMPERATURE: 98.1 F | OXYGEN SATURATION: 100 % | HEIGHT: 58 IN | WEIGHT: 110 LBS | RESPIRATION RATE: 16 BRPM | BODY MASS INDEX: 23.09 KG/M2

## 2022-03-20 DIAGNOSIS — Z20.2 EXPOSURE TO GENITAL HERPES: Primary | ICD-10-CM

## 2022-03-20 LAB
BACTERIA: ABNORMAL
BILIRUBIN URINE: NEGATIVE
CASTS UA: ABNORMAL /LPF
COLOR: YELLOW
EPITHELIAL CELLS UA: ABNORMAL /HPF
GLUCOSE URINE: NEGATIVE
HCG(URINE) PREGNANCY TEST: NEGATIVE
KETONES, URINE: ABNORMAL
LEUKOCYTE ESTERASE, URINE: NEGATIVE
NITRITE, URINE: NEGATIVE
PH UA: 6.5 (ref 5–8)
PROTEIN UA: NEGATIVE
RBC UA: ABNORMAL /HPF
SPECIFIC GRAVITY UA: 1.02 (ref 1–1.03)
TURBIDITY: ABNORMAL
URINE HGB: NEGATIVE
UROBILINOGEN, URINE: NORMAL
WBC UA: ABNORMAL /HPF

## 2022-03-20 PROCEDURE — 87529 HSV DNA AMP PROBE: CPT

## 2022-03-20 PROCEDURE — 81001 URINALYSIS AUTO W/SCOPE: CPT

## 2022-03-20 PROCEDURE — 99283 EMERGENCY DEPT VISIT LOW MDM: CPT

## 2022-03-20 PROCEDURE — 81025 URINE PREGNANCY TEST: CPT

## 2022-03-20 PROCEDURE — 6370000000 HC RX 637 (ALT 250 FOR IP): Performed by: EMERGENCY MEDICINE

## 2022-03-20 RX ORDER — VALACYCLOVIR HYDROCHLORIDE 1 G/1
1000 TABLET, FILM COATED ORAL 2 TIMES DAILY
Qty: 20 TABLET | Refills: 0 | Status: SHIPPED | OUTPATIENT
Start: 2022-03-20 | End: 2022-03-30

## 2022-03-20 RX ORDER — VALACYCLOVIR HYDROCHLORIDE 500 MG/1
1000 TABLET, FILM COATED ORAL ONCE
Status: COMPLETED | OUTPATIENT
Start: 2022-03-20 | End: 2022-03-20

## 2022-03-20 RX ADMIN — VALACYCLOVIR 1000 MG: 500 TABLET, FILM COATED ORAL at 16:24

## 2022-03-20 ASSESSMENT — ENCOUNTER SYMPTOMS
COLOR CHANGE: 0
DIARRHEA: 0
TROUBLE SWALLOWING: 0
VOMITING: 0
NAUSEA: 0
SORE THROAT: 0
BLOOD IN STOOL: 0
ABDOMINAL PAIN: 0
CONSTIPATION: 0
COUGH: 0
SHORTNESS OF BREATH: 0
BACK PAIN: 0

## 2022-03-20 NOTE — ED NOTES
RN at bedside to discuss discharge instructions. Patient verbalized understanding and agrees to the plan of care. RN answered all questions. Patient ambulatory & appears to be in no distress.  Pt agrees to follow up with pcp or return to ED if symptoms worsen        Sulma Osei RN  03/20/22 3136

## 2022-03-20 NOTE — ED TRIAGE NOTES
Patient to emergency department with complaints of a \"bump\" in her genital area. Pt believes it may be herpes, since her ex boyfriend had herpes.

## 2022-03-20 NOTE — ED PROVIDER NOTES
[address]  EMERGENCY DEPARTMENT ENCOUNTER    Pt Name: Sourav Arenas  MRN: [id number]  YOB: 2000  Date of evaluation:3/20/22  PCP: No primary care provider on file. CHIEF COMPLAINT       Chief Complaint   Patient presents with    Exposure to STD       HISTORY OF PRESENT ILLNESS    Sourav Arenas is a 20 y.o. female who presents with a chief complaint of potential exposure to herpes virus. She states that about 6 months ago she broke up with her boyfriend and short time after that he told her that he had been diagnosed with herpes. She states that she has never had any sort of symptoms until a few days ago when she noticed some lesions on her vagina. They are painful. Has not noticed any bleeding, vaginal discharge, has no other symptoms. No other systemic symptoms. No history of STDs. Symptoms are acute. Symptoms are moderate per nothing make symptoms better or worse. Patient has no other complaints at this time. REVIEW OF SYSTEMS       Review of Systems   Constitutional: Negative for chills, fatigue and fever. HENT: Negative for congestion, ear pain, sore throat and trouble swallowing. Eyes: Negative for visual disturbance. Respiratory: Negative for cough and shortness of breath. Cardiovascular: Negative for chest pain, palpitations and leg swelling. Gastrointestinal: Negative for abdominal pain, blood in stool, constipation, diarrhea, nausea and vomiting. Genitourinary: Positive for genital sores and vaginal pain. Negative for dysuria, flank pain, vaginal bleeding and vaginal discharge. Musculoskeletal: Negative for arthralgias, back pain, myalgias and neck pain. Skin: Negative for color change, rash and wound. Neurological: Negative for dizziness, weakness, light-headedness, numbness and headaches. Psychiatric/Behavioral: Negative for confusion. All other systems reviewed and are negative.     Negative in 10 essential Systems except as mentioned above and in the [facility]. PAST MEDICAL HISTORY     Past Medical History:   Diagnosis Date    Post partum depression          SURGICAL HISTORY      has a past surgical history that includes  section (2019). CURRENT MEDICATIONS       Discharge Medication List as of 3/20/2022  4:30 PM      CONTINUE these medications which have NOT CHANGED    Details   ondansetron (ZOFRAN ODT) 4 MG disintegrating tablet Take 1 tablet by mouth every 8 hours as needed for Nausea, Disp-20 tablet,R-0Print      ibuprofen (ADVIL;MOTRIN) 800 MG tablet Take 800 mg by mouth every 6 hours as needed for PainHistorical Med      Prenatal Vit-Fe Fumarate-FA (PRENATAL 1+1 PO) Take by mouthHistorical Med      docusate sodium (COLACE) 100 MG capsule Take 1 capsule by mouth daily, Disp-14 capsule, R-0Print             ALLERGIES     has No Known Allergies. FAMILY HISTORY     has no family status information on file. family history is not on file. SOCIAL HISTORY      reports that she has never smoked. She has never used smokeless tobacco. She reports current drug use. Drug: Marijuana Yolanda Elizabeth). She reports that she does not drink alcohol. PHYSICAL EXAM     INITIAL VITALS:  height is 4' 10\" (1.473 m) and weight is 110 lb (49.9 kg). Her temperature is 98.1 °F (36.7 °C). Her blood pressure is 92/59 (abnormal) and her pulse is 94. Her respiration is 16 and oxygen saturation is 100%. Physical Exam  Vitals and nursing note reviewed. Constitutional:       General: She is not in acute distress. HENT:      Head: Normocephalic and atraumatic. Eyes:      Conjunctiva/sclera: Conjunctivae normal.      Pupils: Pupils are equal, round, and reactive to light. Cardiovascular:      Rate and Rhythm: Normal rate and regular rhythm. Heart sounds: Normal heart sounds. No murmur heard. No friction rub. No gallop. Pulmonary:      Effort: Pulmonary effort is normal. No respiratory distress. Breath sounds: Normal breath sounds. Abdominal:      General: Bowel sounds are normal. There is no distension. Palpations: Abdomen is soft. Tenderness: There is no abdominal tenderness. Genitourinary:     Labia:         Left: Tenderness and lesion (Erythematous lesion on the right labia) present. Musculoskeletal:         General: No tenderness. Cervical back: Neck supple. Lymphadenopathy:      Cervical: No cervical adenopathy. Skin:     General: Skin is warm and dry. Findings: No rash. Neurological:      Mental Status: She is alert and oriented to person, place, and time. Psychiatric:         Judgment: Judgment normal.           DIFFERENTIAL DIAGNOSIS/MDM:   18-year-old female presents with few days of right-sided vaginal lesions. She is afebrile, nontoxic, normal vital signs. No acute distress. Positive exposure to somebody who ended up testing positive for herpes simplex virus about 6 months ago. On exam with female chaperone she does have tenderness along her right labia with some erythematous lesions questionable for herpetic lesions. We will get urinalysis, urine pregnancy, swab for HSV.   I am going to treat regardless based on exam.    DIAGNOSTIC RESULTS     EKG: All EKG's are interpreted by the Emergency Department Physician who either signs or Co-signs this chart in the absence of a cardiologist.        RADIOLOGY:   I directly visualized the following  images and reviewed the radiologist interpretations:  No orders to display           ED BEDSIDE ULTRASOUND:      LABS:  Labs Reviewed   URINALYSIS WITH REFLEX TO CULTURE - Abnormal; Notable for the following components:       Result Value    Turbidity UA Cloudy (*)     Ketones, Urine TRACE (*)     All other components within normal limits   MICROSCOPIC URINALYSIS - Abnormal; Notable for the following components:    Bacteria, UA MODERATE (*)     All other components within normal limits   PREGNANCY, URINE   HERPES SIMPLEX 1 & 2, MOLECULAR EMERGENCY DEPARTMENT COURSE:   Vitals:    Vitals:    03/20/22 1534   BP: (!) 92/59   Pulse: 94   Resp: 16   Temp: 98.1 °F (36.7 °C)   SpO2: 100%   Weight: 110 lb (49.9 kg)   Height: 4' 10\" (1.473 m)              CRITICALCARE:      CONSULTS:  None      PROCEDURES:      FINAL IMPRESSION      1. Exposure to genital herpes            DISPOSITION/PLAN   DISPOSITION Decision To Discharge 03/20/2022 04:29:20 PM          PATIENT REFERRED TO:  [facility] ED  [facility] [address]  [address] [facility] [address]  [phone]    As needed, If symptoms worsen      DISCHARGE MEDICATIONS:  Discharge Medication List as of 3/20/2022  4:30 PM      START taking these medications    Details   valACYclovir (VALTREX) 1 g tablet Take 1 tablet by mouth 2 times daily for 10 days, Disp-20 tablet, R-0Print             The care is provided during an unprecedented national emergency due to the novel coronavirus, COVID-19.     (Please note that portions ofthis note were completed with a voice recognition program.  Efforts were made to edit the dictations but occasionally words are mis-transcribed.)    Marium Gipson DO  Attending Emergency Physician          Marium Gipson DO  03/20/22 7853

## 2022-03-21 LAB
HSV 1, NAAT: NEGATIVE
HSV 2, NAAT: POSITIVE
SPECIMEN DESCRIPTION: ABNORMAL

## 2022-03-21 NOTE — PROGRESS NOTES
Pharmacy Note:    Patient is positive for HSV-2. Patient was given a prescription for valacyclovir during encounter. Attempted to contact patient to discuss results, but there was no answer and no option to leave voicemail. Will attempt again tomorrow and send letter if there's no answer again.      Thank you,  Lory Chery, PharmD, BCPS  [phone]

## 2022-03-22 NOTE — PROGRESS NOTES
Pharmacy Note:    Second attempt to contact patient made. Patient did answer phone. Discussed test results and treatment plan. Advised patient that the medication she was given will help manage the current outbreak, but is not curative. Discussed the need for follow-up with an OB/GYN for ongoing management as this will be a chronic condition. Advised patient that sexual partners should be made aware of positive status and that safe sex practices should be used to minimize risk of transmission. Patient expressed understanding.      Thank you,  Douglas Conroy, PharmD, BCPS  [phone]

## 2022-12-09 ENCOUNTER — HOSPITAL ENCOUNTER (EMERGENCY)
Age: 22
Discharge: HOME | End: 2022-12-09
Attending: EMERGENCY MEDICINE
Payer: COMMERCIAL

## 2022-12-09 VITALS
HEART RATE: 69 BPM | RESPIRATION RATE: 16 BRPM | OXYGEN SATURATION: 96 % | WEIGHT: 100 LBS | BODY MASS INDEX: 20.16 KG/M2 | DIASTOLIC BLOOD PRESSURE: 76 MMHG | HEIGHT: 59 IN | SYSTOLIC BLOOD PRESSURE: 118 MMHG | TEMPERATURE: 97.4 F

## 2022-12-09 DIAGNOSIS — A60.00 RECURRENT GENITAL HERPES: Primary | ICD-10-CM

## 2022-12-09 LAB
BILIRUBIN URINE: NEGATIVE
COLOR: YELLOW
COMMENT UA: NORMAL
GLUCOSE URINE: NEGATIVE
HCG(URINE) PREGNANCY TEST: NEGATIVE
KETONES, URINE: NEGATIVE
LEUKOCYTE ESTERASE, URINE: NEGATIVE
NITRITE, URINE: NEGATIVE
PH UA: 6.5 (ref 5–8)
PROTEIN UA: NEGATIVE
SPECIFIC GRAVITY UA: 1.01 (ref 1–1.03)
TURBIDITY: CLEAR
URINE HGB: NEGATIVE
UROBILINOGEN, URINE: NORMAL

## 2022-12-09 PROCEDURE — 81025 URINE PREGNANCY TEST: CPT

## 2022-12-09 PROCEDURE — 99283 EMERGENCY DEPT VISIT LOW MDM: CPT

## 2022-12-09 PROCEDURE — 81003 URINALYSIS AUTO W/O SCOPE: CPT

## 2022-12-09 RX ORDER — ACYCLOVIR 200 MG/1
200 CAPSULE ORAL
Qty: 25 CAPSULE | Refills: 0 | Status: SHIPPED | OUTPATIENT
Start: 2022-12-09 | End: 2022-12-14

## 2022-12-09 NOTE — ED PROVIDER NOTES
[address]  eMERGENCY dEPARTMENT eNCOUnter      Pt Name: Shabnam Bermeo  MRN: [id number]  [place] 2000  Date of evaluation: 2022  Provider: Lilibeth Santos PA-C    CHIEF COMPLAINT       Chief Complaint   Patient presents with    Exposure to STD           HISTORY OF PRESENT ILLNESS  (Location/Symptom, Timing/Onset, Context/Setting, Quality, Duration, Modifying Factors, Severity.)   Shabnam Bermeo is a 20 y.o. female who presents to the emergency department requesting STD treatment. Pt states she is having a genital herpes outbreak. Symptoms began 3 days ago. This is patients third outbreak. She denies any vaginal bleeding, discharge, odor, nausea, emesis, cp, sob, fevers. She is having mild suprapubic tenderness. Denies dysuria, urgency, frequency. Pt has no other complaints. Nursing Notes were reviewed. REVIEW OF SYSTEMS    (2-9 systems for level 4, 10 or more for level 5)     Review of Systems   Constitutional: Negative. HENT: Negative. Eyes: Negative. Respiratory: Negative. Cardiovascular: Negative. Gastrointestinal: Negative. Musculoskeletal: Negative. Endocrine: Negative. Genitourinary:rash   Skin: Negative. Allergic/Immunologic: Negative. Neurological: Negative. Hematological: Negative. Psychiatric/Behavioral: Negative. Except as noted above the remainder of the review of systems was reviewed and negative.        PAST MEDICAL HISTORY     Past Medical History:   Diagnosis Date    Post partum depression      None otherwise stated in nurses notes    SURGICAL HISTORY       Past Surgical History:   Procedure Laterality Date     SECTION  2019     None otherwise stated in nurses notes    [address]       Discharge Medication List as of 2022  5:04 PM        CONTINUE these medications which have NOT CHANGED    Details   ondansetron (ZOFRAN ODT) 4 MG disintegrating tablet Take 1 tablet by mouth every 8 hours as needed for Nausea, Disp-20 tablet,R-0Print      ibuprofen (ADVIL;MOTRIN) 800 MG tablet Take 800 mg by mouth every 6 hours as needed for PainHistorical Med      Prenatal Vit-Fe Fumarate-FA (PRENATAL 1+1 PO) Take by mouthHistorical Med      docusate sodium (COLACE) 100 MG capsule Take 1 capsule by mouth daily, Disp-14 capsule, R-0Print             ALLERGIES     Patient has no known allergies. FAMILY HISTORY     History reviewed. No pertinent family history. No family status information on file. None otherwise stated in nurses notes    SOCIAL HISTORY      reports that she has never smoked. She has never used smokeless tobacco. She reports current drug use. Drug: Marijuana Will Goodwin). She reports that she does not drink alcohol. lives at home with others     PHYSICAL EXAM    (up to 7 for level 4, 8 or more for level 5)     ED Triage Vitals [12/09/22 1603]   BP Temp Temp src Heart Rate Resp SpO2 Height Weight   118/76 97.4 °F (36.3 °C) -- 69 16 96 % 4' 11\" (1.499 m) 100 lb (45.4 kg)       Physical Exam   Nursing note and vitals reviewed. Constitutional: Oriented to person, place, and time and well-developed, well-nourished. Head: Normocephalic and atraumatic. Ear: External ears normal.   Nose: Nose normal and midline. Eyes: Conjunctivae and EOM are normal. Pupils are equal, round, and reactive to light. Neck: Normal range of motion. Cardiovascular: Normal rate, regular rhythm, normal heart sounds and intact distal pulses. Pulmonary/Chest: Effort normal and breath sounds normal. No respiratory distress. No wheezes. No rales. No chest tenderness. Abdominal: Soft. No distension and no mass. There is no tenderness. There is no rebound and no guarding. Musculoskeletal: Normal range of motion. Neurological: Alert and oriented to person, place, and time. Normal reflexes. Gait normal. GCS score is 15. Skin: Skin is warm and dry. No rash noted. No erythema. No pallor.            DIAGNOSTIC RESULTS     LABS:  Labs Reviewed   PREGNANCY, URINE   URINALYSIS WITH REFLEX TO CULTURE       All other labs were within normal range or not returned as of this dictation. EMERGENCY DEPARTMENT COURSE and DIFFERENTIAL DIAGNOSIS/MDM:   Vitals:    Vitals:    12/09/22 1603   BP: 118/76   Pulse: 69   Resp: 16   Temp: 97.4 °F (36.3 °C)   SpO2: 96%   Weight: 100 lb (45.4 kg)   Height: 4' 11\" (1.499 m)       ED MEDS:  Orders Placed This Encounter   Medications    acyclovir (ZOVIRAX) 200 MG capsule     Sig: Take 1 capsule by mouth 5 times daily for 5 days     Dispense:  25 capsule     Refill:  0       Genital herpes outbreak. Began three days ago. This is her third outbreak. She does not have PCP. UA shows no UTI. Preg is negative. Will start on acyclovir. She was referred to GYN. Discussed results and plan with the pt. They expressed appropriate understanding. Pt given close follow up, supportive care instructions and strict return instructions at the bedside. PATIENT INSTRUCTED THAT TODAYS TEST WITH CHECK FOR GONORRHEA AND CHLAMYDIA; RESULTS WILL TAKE 3-4 DAYS TO RETURN; INSTRUCTED THAT WILL BE NOTIFIED ONLY WITH POSITIVE RESULT; INSTRUCTED THAT TESTING DOES NOT INCLUDE HIV, HEPATITIS, SYPHILLIS, HPV/WARTS, OR HERPES; IF CONCERN FOR THESE OTHER INFECTIONS SHOULD FOLLOW UP WITH PCP, HEALTH DEPARTMENT OR OTHER STI CLINIC. INSTRUCTED ALL RECENT SEXUAL PARTNERS SHOULD BE SEEN BY THEIR PCP AND EVALUATED FOR STI'S. SHOULD SUSTAIN FROM PARTNERED SEXUAL ACTIVITY UNTIL RESULTS COME BACK AND FURTHER EVALUATION/TESTING. Patient instructed to return to the emergency room if symptoms worsen, return, or any other concern right away which is agreed by the patient          CONSULTS:  None    PROCEDURES:  None      FINAL IMPRESSION      1.  Recurrent genital herpes          DISPOSITION/PLAN   DISPOSITION Decision To Discharge    PATIENT REFERRED TO:  [facility]GERY  [address]  [address] [facility] [address] [phone]  Call [facility] ED  [address] [address]  [address] [facility]  [address] [facility]  [phone]    Call       DISCHARGE MEDICATIONS:  Discharge Medication List as of 12/9/2022  5:04 PM        START taking these medications    Details   acyclovir (ZOVIRAX) 200 MG capsule Take 1 capsule by mouth 5 times daily for 5 days, Disp-25 capsule, R-0Normal             (Please note that portions of this note were completed with a voice recognition program.  Efforts were made to edit the dictations but occasionally words are mis-transcribed.)    ALSYA Solano PA-C  12/09/22 7142

## 2022-12-09 NOTE — DISCHARGE INSTRUCTIONS
Please follow up with PCP or GYN. Do not have intercourse. Return to the ED if symptoms change or worsen.

## 2022-12-14 NOTE — ED PROVIDER NOTES
eMERGENCY dEPARTMENT eNCOUnter   [address] [facility]     Pt Name: Anastasia Keller  MRN: [id number]  [place] 2000  Date of evaluation: 12/14/22     Anastasia Keller is a 20 y.o. female with CC: Exposure to STD      Based on the medical record the care appears appropriate. I was personally available for consultation in the Emergency Department. The care is provided during an unprecedented national emergency due to the novel coronavirus, COVID 19.     Rich Johnson MD  Attending Emergency Physician                  Rich Johnson MD  12/14/22 3128

## 2023-06-12 ENCOUNTER — HOSPITAL ENCOUNTER (EMERGENCY)
Age: 23
Discharge: HOME | End: 2023-06-12
Attending: EMERGENCY MEDICINE
Payer: COMMERCIAL

## 2023-06-12 VITALS
HEART RATE: 65 BPM | OXYGEN SATURATION: 100 % | WEIGHT: 113 LBS | RESPIRATION RATE: 14 BRPM | SYSTOLIC BLOOD PRESSURE: 119 MMHG | HEIGHT: 59 IN | DIASTOLIC BLOOD PRESSURE: 79 MMHG | TEMPERATURE: 98.5 F | BODY MASS INDEX: 22.78 KG/M2

## 2023-06-12 DIAGNOSIS — A60.04 HERPES SIMPLEX VULVOVAGINITIS: Primary | ICD-10-CM

## 2023-06-12 PROCEDURE — 6370000000 HC RX 637 (ALT 250 FOR IP): Performed by: STUDENT IN AN ORGANIZED HEALTH CARE EDUCATION/TRAINING PROGRAM

## 2023-06-12 PROCEDURE — 99283 EMERGENCY DEPT VISIT LOW MDM: CPT

## 2023-06-12 RX ORDER — VALACYCLOVIR HYDROCHLORIDE 500 MG/1
500 TABLET, FILM COATED ORAL 2 TIMES DAILY
Qty: 6 TABLET | Refills: 0 | Status: SHIPPED | OUTPATIENT
Start: 2023-06-12 | End: 2023-06-15

## 2023-06-12 RX ORDER — VALACYCLOVIR HYDROCHLORIDE 500 MG/1
500 TABLET, FILM COATED ORAL ONCE
Status: COMPLETED | OUTPATIENT
Start: 2023-06-12 | End: 2023-06-12

## 2023-06-12 RX ADMIN — VALACYCLOVIR 500 MG: 500 TABLET, FILM COATED ORAL at 15:47

## 2023-06-12 ASSESSMENT — PAIN - FUNCTIONAL ASSESSMENT: PAIN_FUNCTIONAL_ASSESSMENT: NONE - DENIES PAIN

## 2023-06-12 ASSESSMENT — ENCOUNTER SYMPTOMS
VOMITING: 0
NAUSEA: 0

## 2023-06-12 NOTE — ED PROVIDER NOTES
[address] [facility]     Emergency Department     Faculty Attestation    I performed a history and physical examination of the patient and discussed management with the resident. I have reviewed and agree with the residents findings including all diagnostic interpretations, and treatment plans as written at the time of my review. Any areas of disagreement are noted on the chart. I was personally present for the key portions of any procedures. I have documented in the chart those procedures where I was not present during the key portions. For Physician Assistant/ Nurse Practitioner cases/documentation I have personally evaluated this patient and have completed at least one if not all key elements of the E/M (history, physical exam, and MDM). Additional findings are as noted. PtName: Yo Correa  MRN: [id number]  [place] 2000  Date of evaluation: 6/12/23  Note Started: 2:51 PM EDT    Primary Care Physician: No primary care provider on file. History: This is a 24 y.o. female who presents to the Emergency Department with complaint of medication refill for history of herpes. Patient states she has been treated with Valtrex in the past.  Patient states she noted an ulcer on her vagina earlier yesterday. She denies any abdominal pain, vaginal bleeding or discharge. She denies any dysuria, frequency or urgency. The patient states she has not found a PCP or OB/GYN for follow-up but is attempting to look for 1. Physical:   vitals were not taken for this visit. Patient is awake alert nontoxic-appearing no acute distress abdomen soft nontender, pelvic exam performed by the resident. Impression: Herpes    Plan: Valtrex      Medical Decision Making  Risk  Prescription drug management.             (Please note that portions of this note were completed with a voice recognition program.  Efforts were made to edit the dictations but occasionally words are

## 2023-06-12 NOTE — ED PROVIDER NOTES
[facility]  ED  Emergency Department Encounter  Emergency Medicine Resident     Pt Name:Alpa Anand  MRN: [id number]  [place] 2000  Date of evaluation: 23  PCP:  No primary care provider on file. 3:12 PM EDT     CHIEF COMPLAINT       Chief Complaint   Patient presents with    Medication Refill     Valacyclovir 20mg        HISTORY OF PRESENT ILLNESS  (Location/Symptom, Timing/Onset, Context/Setting, Quality, Duration, Modifying Factors, Severity.)      Marleen Player is a 24 y.o. female who presents with request for Valtrex for genital herpes. She has had an outbreak for the past few days and ran out of valacyclovir. Has used it before without difficulty. Denies dysuria, vaginal discharge, abdominal pain, fevers, chills, nausea or vomiting. PAST MEDICAL / SURGICAL / SOCIAL / FAMILY HISTORY      has a past medical history of Herpes genitalis in women and Post partum depression. has a past surgical history that includes  section (2019). Social History     Socioeconomic History    Marital status: Single     Spouse name: Not on file    Number of children: Not on file    Years of education: Not on file    Highest education level: Not on file   Occupational History    Not on file   Tobacco Use    Smoking status: Never    Smokeless tobacco: Never   Vaping Use    Vaping Use: Never used   Substance and Sexual Activity    Alcohol use: Yes     Comment: socially    Drug use: Yes     Types: Marijuana [place])     Comment: daily     Sexual activity: Yes   Other Topics Concern    Not on file   Social History Narrative    Not on file     Social Determinants of Health     Financial Resource Strain: Not on file   Food Insecurity: Not on file   Transportation Needs: Not on file   Physical Activity: Not on file   Stress: Not on file   Social Connections: Not on file   Intimate Partner Violence: Not on file   Housing Stability: Not on file       History reviewed.  No pertinent family

## 2023-06-12 NOTE — DISCHARGE INSTRUCTIONS
Do not have sexual intercourse during a herpes outbreak. Camron Sanders!!!    From [facility] Emergency Department    On behalf of the Emergency Department staff at [facility], I would like to thank you for giving [facility] the opportunity to address your health care needs and concerns. We hope that during your visit, our service was delivered in a professional and caring manner. Please keep [facility] in mind as we walk with you down the path to your own personal wellness. Please expect an automated phone call from [phone] so we can ask a few questions about your health and progress. Based on your answers, a clinician may call you back to offer help and instructions. If you notice any concerning symptoms please return to the ER immediately. These can include but are not limited to: fevers, chills, shortness of breath, vomiting, weakness of the extremities, changes in your mental status, numbness, pale extremities, or chest pain.

## 2023-06-12 NOTE — ED TRIAGE NOTES
Patient reports having a herpes breakout, requesting a rx for Valacyclovir 20mg. States she does not currently have a MD managing her symptoms right now, is looking for a new one. Patient states she was diagnosed with herpes about 1 year ago.

## 2023-09-11 ENCOUNTER — HOSPITAL ENCOUNTER (EMERGENCY)
Age: 23
Discharge: HOME | End: 2023-09-11
Attending: EMERGENCY MEDICINE
Payer: COMMERCIAL

## 2023-09-11 VITALS
BODY MASS INDEX: 20.97 KG/M2 | RESPIRATION RATE: 15 BRPM | OXYGEN SATURATION: 99 % | SYSTOLIC BLOOD PRESSURE: 102 MMHG | DIASTOLIC BLOOD PRESSURE: 57 MMHG | WEIGHT: 103.84 LBS | HEART RATE: 67 BPM | TEMPERATURE: 97.2 F

## 2023-09-11 DIAGNOSIS — N30.00 ACUTE CYSTITIS WITHOUT HEMATURIA: ICD-10-CM

## 2023-09-11 DIAGNOSIS — B96.89 BV (BACTERIAL VAGINOSIS): ICD-10-CM

## 2023-09-11 DIAGNOSIS — N89.8 VAGINAL DISCHARGE: ICD-10-CM

## 2023-09-11 DIAGNOSIS — N76.0 BV (BACTERIAL VAGINOSIS): ICD-10-CM

## 2023-09-11 DIAGNOSIS — A59.9 TRICHIMONIASIS: ICD-10-CM

## 2023-09-11 DIAGNOSIS — R30.0 DYSURIA: Primary | ICD-10-CM

## 2023-09-11 LAB
BACTERIA URNS QL MICRO: ABNORMAL
BILIRUB UR QL STRIP: NEGATIVE
CANDIDA SPECIES: NEGATIVE
CASTS #/AREA URNS LPF: ABNORMAL /LPF (ref 0–2)
CHP ED QC CHECK: YES
CLARITY UR: ABNORMAL
COLOR UR: YELLOW
EPI CELLS #/AREA URNS HPF: ABNORMAL /HPF (ref 0–5)
GARDNERELLA VAGINALIS: POSITIVE
GLUCOSE UR STRIP-MCNC: NEGATIVE MG/DL
HGB UR QL STRIP.AUTO: ABNORMAL
KETONES UR STRIP-MCNC: NEGATIVE MG/DL
LEUKOCYTE ESTERASE UR QL STRIP: ABNORMAL
NITRITE UR QL STRIP: POSITIVE
PH UR STRIP: 6 [PH] (ref 5–8)
PREGNANCY TEST URINE, POC: NEGATIVE
PROT UR STRIP-MCNC: ABNORMAL MG/DL
RBC #/AREA URNS HPF: ABNORMAL /HPF (ref 0–2)
SOURCE: ABNORMAL
SP GR UR STRIP: 1.02 (ref 1–1.03)
TRICHOMONAS: POSITIVE
UROBILINOGEN UR STRIP-ACNC: NORMAL EU/DL (ref 0–1)
WBC #/AREA URNS HPF: ABNORMAL /HPF (ref 0–5)

## 2023-09-11 PROCEDURE — 87480 CANDIDA DNA DIR PROBE: CPT

## 2023-09-11 PROCEDURE — 87591 N.GONORRHOEAE DNA AMP PROB: CPT

## 2023-09-11 PROCEDURE — 87186 SC STD MICRODIL/AGAR DIL: CPT

## 2023-09-11 PROCEDURE — 6370000000 HC RX 637 (ALT 250 FOR IP): Performed by: HEALTH CARE PROVIDER

## 2023-09-11 PROCEDURE — 99283 EMERGENCY DEPT VISIT LOW MDM: CPT

## 2023-09-11 PROCEDURE — 87184 SC STD DISK METHOD PER PLATE: CPT

## 2023-09-11 PROCEDURE — 81001 URINALYSIS AUTO W/SCOPE: CPT

## 2023-09-11 PROCEDURE — 87491 CHLMYD TRACH DNA AMP PROBE: CPT

## 2023-09-11 PROCEDURE — 87510 GARDNER VAG DNA DIR PROBE: CPT

## 2023-09-11 PROCEDURE — 87086 URINE CULTURE/COLONY COUNT: CPT

## 2023-09-11 PROCEDURE — 86403 PARTICLE AGGLUT ANTBDY SCRN: CPT

## 2023-09-11 PROCEDURE — 87077 CULTURE AEROBIC IDENTIFY: CPT

## 2023-09-11 PROCEDURE — 87660 TRICHOMONAS VAGIN DIR PROBE: CPT

## 2023-09-11 RX ORDER — CEPHALEXIN 500 MG/1
500 CAPSULE ORAL ONCE
Status: COMPLETED | OUTPATIENT
Start: 2023-09-11 | End: 2023-09-11

## 2023-09-11 RX ORDER — METRONIDAZOLE 500 MG/1
500 TABLET ORAL 2 TIMES DAILY
Qty: 14 TABLET | Refills: 0 | Status: SHIPPED | OUTPATIENT
Start: 2023-09-11 | End: 2023-09-18

## 2023-09-11 RX ORDER — FLUCONAZOLE 150 MG/1
150 TABLET ORAL
Qty: 1 TABLET | Refills: 0 | Status: SHIPPED | OUTPATIENT
Start: 2023-09-11 | End: 2023-09-11

## 2023-09-11 RX ORDER — CEPHALEXIN 500 MG/1
500 CAPSULE ORAL 2 TIMES DAILY
Qty: 10 CAPSULE | Refills: 0 | Status: SHIPPED | OUTPATIENT
Start: 2023-09-11 | End: 2023-09-16

## 2023-09-11 RX ORDER — METRONIDAZOLE 500 MG/1
500 TABLET ORAL ONCE
Status: COMPLETED | OUTPATIENT
Start: 2023-09-11 | End: 2023-09-11

## 2023-09-11 RX ORDER — IBUPROFEN 800 MG/1
800 TABLET ORAL ONCE
Status: COMPLETED | OUTPATIENT
Start: 2023-09-11 | End: 2023-09-11

## 2023-09-11 RX ORDER — PHENAZOPYRIDINE HYDROCHLORIDE 100 MG/1
100 TABLET, FILM COATED ORAL 3 TIMES DAILY PRN
Qty: 9 TABLET | Refills: 0 | Status: SHIPPED | OUTPATIENT
Start: 2023-09-11 | End: 2023-09-14

## 2023-09-11 RX ADMIN — IBUPROFEN 800 MG: 800 TABLET, FILM COATED ORAL at 22:11

## 2023-09-11 RX ADMIN — METRONIDAZOLE 500 MG: 500 TABLET ORAL at 23:10

## 2023-09-11 RX ADMIN — CEPHALEXIN 500 MG: 500 CAPSULE ORAL at 23:10

## 2023-09-11 ASSESSMENT — ENCOUNTER SYMPTOMS
ABDOMINAL PAIN: 0
SHORTNESS OF BREATH: 0
CONSTIPATION: 0
VOMITING: 0
NAUSEA: 0
SORE THROAT: 0
DIARRHEA: 0

## 2023-09-11 ASSESSMENT — PAIN SCALES - GENERAL
PAINLEVEL_OUTOF10: 6
PAINLEVEL_OUTOF10: 7

## 2023-09-11 ASSESSMENT — PAIN - FUNCTIONAL ASSESSMENT: PAIN_FUNCTIONAL_ASSESSMENT: 0-10

## 2023-09-12 LAB
C TRACH DNA SPEC QL PROBE+SIG AMP: NEGATIVE
N GONORRHOEA DNA SPEC QL PROBE+SIG AMP: NEGATIVE
SPECIMEN DESCRIPTION: NORMAL

## 2023-09-12 NOTE — ED PROVIDER NOTES
tenderness. Negative Rovsing sign. Negative rebound or guarding. No CVA tenderness. Impression is probable UTI, consider nephrolithiasis, cervicitis, vaginitis, rule out pregnancy. Plan is labs, analgesics, antimicrobials. Bud Fee.  Lety Sierra MD, [facility]  Attending Emergency  Physician                Diane Ash MD  09/11/23 [id number]
Medication List as of 9/11/2023 11:16 PM        START taking these medications    Details   cephALEXin (KEFLEX) 500 MG capsule Take 1 capsule by mouth 2 times daily for 5 days, Disp-10 capsule, R-0Print      metroNIDAZOLE (FLAGYL) 500 MG tablet Take 1 tablet by mouth 2 times daily for 7 days, Disp-14 tablet, R-0Print      fluconazole (DIFLUCAN) 150 MG tablet Take 1 tablet by mouth once as needed (For yeast infection after antibiotics), Disp-1 tablet, R-0Print      phenazopyridine (PYRIDIUM) 100 MG tablet Take 1 tablet by mouth 3 times daily as needed for Pain, Disp-9 tablet, R-0Print             Teo Carson DO  Emergency Medicine Resident    (Please note that portions of this note were completed with a voice recognition program.  Efforts were made to edit the dictations but occasionally words are mis-transcribed.)      Valentina Patel DO  Resident  09/12/23 1846

## 2023-09-12 NOTE — ED NOTES
Pt to ED c/o RLQ abd pain that started toady and dysuria/hematuria x3 days. Pt states she wants a pregnancy test. Pt states she took a pregnancy test at home yesterday that was negative. Pt states she took tylenol around 7pm tonight with some relief. Pt alert and oriented x4, talking in complete sentences, respirations even and unlabored. Pt acting age appropriate.  White board updated, will continue to plan of care      [employer], RN  09/11/23 [id number]

## 2023-09-12 NOTE — DISCHARGE INSTRUCTIONS
Call today or tomorrow to follow up with your primary care provider in 2-3 days. If you do not have a primary care provider you can follow-up with the [facility] clinic with information provided for you here. Your vaginal screenings were positive for trichomonas and bacterial vaginosis. You can see the attached instructions for more information. Your gonorrhea and Chlamydia testing are still pending at this time. Your pregnancy test was negative. Your urinalysis did show signs of a urinary tract infection we will treat you with antibiotics. Please take the full course prescribed. You can take the Pyridium for the first couple days to help with the pain with urination. Please note, this medication can cause your urine and tears to be a orange to red color. Do not have any sexual intercourse until the test results (if obtained) are completed in 2 - 3 days. STI Testing results are available on [facility], here in the hospital with ID, and you should follow up with these with the health department or primary doctor. Make sure you use condoms at all times. Take your medication as indicated, if you are given an antibiotic then make sure you get the prescription filled and take the antibiotics until finished. Drink plenty of water while taking the antibiotics. Avoid drinking alcohol or drinks that have caffeine it in while taking antibiotics. [facility] has some antibiotics for free; [facility] has a 4 dollar prescription plan for some antibiotics. Return to the Emergency Department for worsening of symptoms, pain with urination, any vaginal discharge, any other care or concern.

## 2023-09-14 LAB
MICROORGANISM SPEC CULT: ABNORMAL
MICROORGANISM SPEC CULT: ABNORMAL
SPECIMEN DESCRIPTION: ABNORMAL

## 2023-09-15 NOTE — PROGRESS NOTES
At the time of Kwadwo Muniz visit to [address] [place] [address] Emergency Room on 9/11/23 a urine culture was obtained. It was positive for Enterobacter cloacae complex and Staphylococcus saprophyticus. Attempted to contact patient via phone number in chart, left voicemail. Macrobid 100 mg #14 1 capsule BID x 7 days called into [facility] ([phone]) on behalf of Dr. Joseluis Bermudez. Instructed pharmacy to educate patient to stop taking Cephalexin.      Melody Johnson, PharmD  PGY2 Pharmacy Resident 9/15/2023 5:18 PM

## 2024-05-11 ENCOUNTER — HOSPITAL ENCOUNTER (EMERGENCY)
Age: 24
Discharge: HOME | End: 2024-05-11
Attending: EMERGENCY MEDICINE
Payer: COMMERCIAL

## 2024-05-11 VITALS
TEMPERATURE: 98.9 F | BODY MASS INDEX: 19.96 KG/M2 | DIASTOLIC BLOOD PRESSURE: 80 MMHG | RESPIRATION RATE: 18 BRPM | HEIGHT: 59 IN | OXYGEN SATURATION: 98 % | HEART RATE: 100 BPM | WEIGHT: 99 LBS | SYSTOLIC BLOOD PRESSURE: 115 MMHG

## 2024-05-11 DIAGNOSIS — N76.0 BACTERIAL VAGINOSIS: ICD-10-CM

## 2024-05-11 DIAGNOSIS — N93.9 VAGINAL BLEEDING: Primary | ICD-10-CM

## 2024-05-11 DIAGNOSIS — B96.89 BACTERIAL VAGINOSIS: ICD-10-CM

## 2024-05-11 LAB
C TRACH DNA SPEC QL PROBE+SIG AMP: NORMAL
CANDIDA SPECIES: NEGATIVE
GARDNERELLA VAGINALIS: POSITIVE
HCG UR QL: NEGATIVE
N GONORRHOEA DNA SPEC QL PROBE+SIG AMP: NORMAL
SOURCE: ABNORMAL
SPECIMEN DESCRIPTION: NORMAL
TRICHOMONAS: NEGATIVE

## 2024-05-11 PROCEDURE — 99283 EMERGENCY DEPT VISIT LOW MDM: CPT | Performed by: EMERGENCY MEDICINE

## 2024-05-11 PROCEDURE — 87491 CHLMYD TRACH DNA AMP PROBE: CPT

## 2024-05-11 PROCEDURE — 87660 TRICHOMONAS VAGIN DIR PROBE: CPT

## 2024-05-11 PROCEDURE — 6370000000 HC RX 637 (ALT 250 FOR IP): Performed by: EMERGENCY MEDICINE

## 2024-05-11 PROCEDURE — 87480 CANDIDA DNA DIR PROBE: CPT

## 2024-05-11 PROCEDURE — 87591 N.GONORRHOEAE DNA AMP PROB: CPT

## 2024-05-11 PROCEDURE — 81025 URINE PREGNANCY TEST: CPT

## 2024-05-11 PROCEDURE — 87510 GARDNER VAG DNA DIR PROBE: CPT

## 2024-05-11 RX ORDER — IBUPROFEN 800 MG/1
800 TABLET ORAL ONCE
Status: COMPLETED | OUTPATIENT
Start: 2024-05-11 | End: 2024-05-11

## 2024-05-11 RX ORDER — ACETAMINOPHEN 500 MG
1000 TABLET ORAL ONCE
Status: COMPLETED | OUTPATIENT
Start: 2024-05-11 | End: 2024-05-11

## 2024-05-11 RX ORDER — IBUPROFEN 400 MG/1
400 TABLET ORAL ONCE
Status: DISCONTINUED | OUTPATIENT
Start: 2024-05-11 | End: 2024-05-11

## 2024-05-11 RX ORDER — METRONIDAZOLE 500 MG/1
500 TABLET ORAL 2 TIMES DAILY
Qty: 14 TABLET | Refills: 0 | Status: SHIPPED | OUTPATIENT
Start: 2024-05-11 | End: 2024-05-18

## 2024-05-11 RX ADMIN — ACETAMINOPHEN 1000 MG: 500 TABLET ORAL at 20:20

## 2024-05-11 RX ADMIN — IBUPROFEN 800 MG: 800 TABLET, FILM COATED ORAL at 22:16

## 2024-05-11 ASSESSMENT — ENCOUNTER SYMPTOMS
VOMITING: 0
SHORTNESS OF BREATH: 0
BACK PAIN: 1
ABDOMINAL PAIN: 1
NAUSEA: 0

## 2024-05-11 NOTE — ED PROVIDER NOTES
[facility] ED  Emergency Department Encounter  Emergency Medicine Resident     Pt Name:Alpa Mcneal  MRN: [id number]  Birthdate 2000  Date of evaluation: 24  PCP:  No primary care provider on file.  Note Started: 7:56 PM EDT      CHIEF COMPLAINT       Chief Complaint   Patient presents with    Vaginal Bleeding       HISTORY OF PRESENT ILLNESS  (Location/Symptom, Timing/Onset, Context/Setting, Quality, Duration, Modifying Factors, Severity.)      Alpa Mcneal is a 24 y.o. female who presents with vaginal bleeding for 3 days. Patient states her periods are typically regular lasting 6-7 days. Her last period finished on . She states today she had some cramping as well. She states she could be pregnant but has not taken any test at home. She denies v/d. No chest pain or shortness of breath. No other vaginal discharge. NO recent vaginal procedures. She does have a OBGYN but has not seen them recently.     PAST MEDICAL / SURGICAL / SOCIAL / FAMILY HISTORY      has a past medical history of Herpes genitalis in women and Post partum depression.       has a past surgical history that includes  section (2019).      Social History     Socioeconomic History    Marital status: Single     Spouse name: Not on file    Number of children: Not on file    Years of education: Not on file    Highest education level: Not on file   Occupational History    Not on file   Tobacco Use    Smoking status: Never    Smokeless tobacco: Never   Vaping Use    Vaping Use: Never used   Substance and Sexual Activity    Alcohol use: Not Currently     Comment: socially    Drug use: Yes     Types: Marijuana ([place])     Comment: daily     Sexual activity: Yes   Other Topics Concern    Not on file   Social History Narrative    Not on file     Social Determinants of Health     Financial Resource Strain: Not on file   Food Insecurity: Not on file   Transportation Needs: Not on file   Physical Activity: Not on file

## 2024-05-12 NOTE — DISCHARGE INSTRUCTIONS
You were seen today for vaginal bleeding. Your pregnancy test was negative. Your gonorrhea and chlamydia tests will be available on [facility] tomorrow. Your vaginitis test was positive for Bacterial vaginosis. Please start taking antibiotics as prescribed. Do not drink alcohol while taking antibiotics.     If you notice any concerning symptoms please return to the ER immediately. These can include but are not limited to: fevers, chills, shortness of breath, vomiting, weakness of the extremities, changes in your mental status, numbness, pale extremities, or chest pain.     Wound care: none    Diet: You may resume your normal diet     Activity: resume activity as tolerated.     Medications: Continue taking your home medications as previously directed. For pain You may take tylenol 1,000mg by mouth every 6 hours as needed for pain. Do not exceed 4,000mg per day. If you have liver disease don't take tylenol. You may also take ibuprofen 600mg every 6-8 hours as needed for pain. Do not exceed 2,400 mg per day. If you experience stomach pain or you have a history of kidney disease stop taking ibuprofen. You may alternate application of ice and heat 20 minutes at a time as desired.      Follow up: Please follow up with your primary care doctor or OBGYN as soon as possible

## 2024-05-12 NOTE — ED NOTES
Pt presents to Ed states she has been having vaginal bleeding. Her period ended about April 24th and still is having bleeding, she is not saturating pads but there are \"clumps\" and she could be pregnant, she has not taken a pregnancy test. Pt alert and oriented x4. Pt put on bp cuff and pulse ox. Blankets given for comfort.   RR non labored and even.

## 2024-05-12 NOTE — ED PROVIDER NOTES
Note Started: 8:44 PM EDT         [facility]     Emergency Department     Faculty Attestation    I performed a history and physical examination of the patient and discussed management with the resident. I reviewed the resident’s note and agree with the documented findings and plan of care. Any areas of disagreement are noted on the chart. I was personally present for the key portions of any procedures. I have documented in the chart those procedures where I was not present during the key portions. I have reviewed the emergency nurses triage note. I agree with the chief complaint, past medical history, past surgical history, allergies, medications, social and family history as documented unless otherwise noted below.        For Physician Assistant/ Nurse Practitioner cases/documentation I have personally evaluated this patient and have completed at least one if not all key elements of the E/M (history, physical exam, and MDM). Additional findings are as noted.  I have personally seen and evaluated the patient.  I find the patient's history and physical exam are consistent with the NP/PA documentation.  I agree with the care provided, treatment rendered, disposition and follow-up plan.    24-year-old female presenting with atypical vaginal bleeding.  LMP is typically 6 to 7 days in length, regular.  She had her last period complete on 4/24, and now has had spotting/bleeding for the last 3 days.  She is sexually active, not on OCPs.    Exam:  General : Laying on the bed, awake, alert, and in no acute distress  CV : normal rate and regular rhythm  Lungs : Breathing comfortably on room air with no tachypnea, hypoxia, or increased work of breathing    DDx: Pregnancy, ectopic pregnancy, dysfunctional uterine bleeding, fibroid uterus    Plan:  Pelvic exam  Pregnancy test  If testing negative, OB follow up outpatient.    Medical Decision Making  Amount and/or Complexity of Data Reviewed  Labs:

## 2025-01-30 ENCOUNTER — HOSPITAL ENCOUNTER (EMERGENCY)
Age: 25
Discharge: HOME | End: 2025-01-30
Attending: STUDENT IN AN ORGANIZED HEALTH CARE EDUCATION/TRAINING PROGRAM
Payer: COMMERCIAL

## 2025-01-30 VITALS
DIASTOLIC BLOOD PRESSURE: 62 MMHG | SYSTOLIC BLOOD PRESSURE: 105 MMHG | OXYGEN SATURATION: 100 % | HEART RATE: 77 BPM | TEMPERATURE: 98.2 F | RESPIRATION RATE: 25 BRPM

## 2025-01-30 DIAGNOSIS — R55 VASOVAGAL SYNCOPE: Primary | ICD-10-CM

## 2025-01-30 LAB
ALBUMIN SERPL-MCNC: 4.4 G/DL (ref 3.5–5.2)
ALBUMIN/GLOB SERPL: 1.6 {RATIO} (ref 1–2.5)
ALP SERPL-CCNC: 53 U/L (ref 35–104)
ALT SERPL-CCNC: 12 U/L (ref 10–35)
ANION GAP SERPL CALCULATED.3IONS-SCNC: 10 MMOL/L (ref 9–16)
AST SERPL-CCNC: 24 U/L (ref 10–35)
BASOPHILS # BLD: 0.07 K/UL (ref 0–0.2)
BASOPHILS NFR BLD: 1 % (ref 0–2)
BILIRUB SERPL-MCNC: 0.2 MG/DL (ref 0–1.2)
BUN SERPL-MCNC: 12 MG/DL (ref 6–20)
CALCIUM SERPL-MCNC: 9.3 MG/DL (ref 8.6–10.4)
CHLORIDE SERPL-SCNC: 106 MMOL/L (ref 98–107)
CHP ED QC CHECK: YES
CO2 SERPL-SCNC: 24 MMOL/L (ref 20–31)
CREAT SERPL-MCNC: 0.8 MG/DL (ref 0.6–0.9)
EOSINOPHIL # BLD: 0.11 K/UL (ref 0–0.44)
EOSINOPHILS RELATIVE PERCENT: 2 % (ref 1–4)
ERYTHROCYTE [DISTWIDTH] IN BLOOD BY AUTOMATED COUNT: 14.2 % (ref 11.8–14.4)
GFR, ESTIMATED: >90 ML/MIN/1.73M2
GLUCOSE BLD-MCNC: 91 MG/DL (ref 65–105)
GLUCOSE BLD-MCNC: 97 MG/DL
GLUCOSE SERPL-MCNC: 82 MG/DL (ref 74–99)
HCG SERPL QL: NEGATIVE
HCT VFR BLD AUTO: 37.2 % (ref 36.3–47.1)
HGB BLD-MCNC: 12.2 G/DL (ref 11.9–15.1)
IMM GRANULOCYTES # BLD AUTO: <0.03 K/UL (ref 0–0.3)
IMM GRANULOCYTES NFR BLD: 0 %
LYMPHOCYTES NFR BLD: 3.16 K/UL (ref 1.1–3.7)
LYMPHOCYTES RELATIVE PERCENT: 59 % (ref 24–43)
MCH RBC QN AUTO: 27.8 PG (ref 25.2–33.5)
MCHC RBC AUTO-ENTMCNC: 32.8 G/DL (ref 28.4–34.8)
MCV RBC AUTO: 84.7 FL (ref 82.6–102.9)
MONOCYTES NFR BLD: 0.57 K/UL (ref 0.1–1.2)
MONOCYTES NFR BLD: 10 % (ref 3–12)
NEUTROPHILS NFR BLD: 28 % (ref 36–65)
NEUTS SEG NFR BLD: 1.57 K/UL (ref 1.5–8.1)
NRBC BLD-RTO: 0 PER 100 WBC
PLATELET # BLD AUTO: 164 K/UL (ref 138–453)
PMV BLD AUTO: 11.6 FL (ref 8.1–13.5)
POTASSIUM SERPL-SCNC: 4 MMOL/L (ref 3.7–5.3)
PROT SERPL-MCNC: 7.2 G/DL (ref 6.6–8.7)
RBC # BLD AUTO: 4.39 M/UL (ref 3.95–5.11)
SODIUM SERPL-SCNC: 140 MMOL/L (ref 136–145)
WBC OTHER # BLD: 5.5 K/UL (ref 3.5–11.3)

## 2025-01-30 PROCEDURE — 82947 ASSAY GLUCOSE BLOOD QUANT: CPT

## 2025-01-30 PROCEDURE — 80053 COMPREHEN METABOLIC PANEL: CPT

## 2025-01-30 PROCEDURE — 85025 COMPLETE CBC W/AUTO DIFF WBC: CPT

## 2025-01-30 PROCEDURE — 99284 EMERGENCY DEPT VISIT MOD MDM: CPT

## 2025-01-30 PROCEDURE — 2580000003 HC RX 258

## 2025-01-30 PROCEDURE — 93005 ELECTROCARDIOGRAM TRACING: CPT

## 2025-01-30 PROCEDURE — 84703 CHORIONIC GONADOTROPIN ASSAY: CPT

## 2025-01-30 RX ORDER — 0.9 % SODIUM CHLORIDE 0.9 %
1000 INTRAVENOUS SOLUTION INTRAVENOUS ONCE
Status: COMPLETED | OUTPATIENT
Start: 2025-01-30 | End: 2025-01-30

## 2025-01-30 RX ADMIN — SODIUM CHLORIDE 1000 ML: 9 INJECTION, SOLUTION INTRAVENOUS at 12:06

## 2025-01-30 ASSESSMENT — ENCOUNTER SYMPTOMS
DIARRHEA: 0
VOMITING: 0
ABDOMINAL PAIN: 0
SHORTNESS OF BREATH: 0
NAUSEA: 0
COUGH: 0
BACK PAIN: 0

## 2025-01-30 ASSESSMENT — LIFESTYLE VARIABLES
HOW OFTEN DO YOU HAVE A DRINK CONTAINING ALCOHOL: MONTHLY OR LESS
HOW MANY STANDARD DRINKS CONTAINING ALCOHOL DO YOU HAVE ON A TYPICAL DAY: 1 OR 2

## 2025-01-30 ASSESSMENT — PAIN - FUNCTIONAL ASSESSMENT: PAIN_FUNCTIONAL_ASSESSMENT: NONE - DENIES PAIN

## 2025-01-30 NOTE — ED NOTES
Labeled blood specimens sent to lab via tube system.    [x] Green/yellow  [x] Lavender   [] on ice   [] Blue   [x] Green/black [] on ice  [] Yellow  [] on ice  [] Red  [] Pink  [] Blood Cultures  [] x1 [] X2    [] Ped Green  [] on ice  [] Ped Lavender  [] on ice    [] Ped Yellow  [] on ice  [] Ped Red

## 2025-01-30 NOTE — ED NOTES
Labeled blood specimens sent to lab via tube system.    [x] Green/yellow  [] Lavender   [] on ice   [] Blue   [] Green/black [] on ice  [] Yellow  [] on ice  [] Red  [] Pink  [] Blood Cultures  [] x1 [] X2    [] Ped Green  [] on ice  [] Ped Lavender  [] on ice    [] Ped Yellow  [] on ice  [] Ped Red    No

## 2025-01-30 NOTE — DISCHARGE INSTRUCTIONS
Call today or tomorrow to follow up with your primary care provider  in 3-7 days.    Get up slowly; dangle your feet over the bed before standing up, do not stand up quickly.    Return to the Emergency Department for blacking out, any headache, slurring of speech, loss of strength, excessive nausea or vomiting, or any other care or concern.

## 2025-01-30 NOTE — ED TRIAGE NOTES
Pt presented to ED 24 as a rapid Response  Pt states she was visiting a family member and when she was leaving she started to feel weak and dizzy. Pt states she sat on the ground then passed out. Pt states it is unknown how long she was down. Pt states this has happened before when she was pregnant. Pt states she has not eaten very much today.   Pt denies falling, hitting her head, any pain, N/V/D, chest pain, SOB.  Pt placed on cont cardiac monitor, ekg completed, iv established, labs drawn, labeled and will send to lab via orders   Pt is Alert and oriented. Pt is resting comfortably on stretcher with call light in reach.  No acute distress noted. Respirations are even and unlabored.  White board updated. Will continue to follow plan of care.

## 2025-01-30 NOTE — ED PROVIDER NOTES
[facility]     Emergency Department     Faculty Attestation    I performed a history and physical examination of the patient and discussed management with the resident. I have reviewed and agree with the resident’s findings including all diagnostic interpretations, and treatment plans as written at the time of my review. Any areas of disagreement are noted on the chart. I was personally present for the key portions of any procedures. I have documented in the chart those procedures where I was not present during the key portions. For Physician Assistant/ Nurse Practitioner cases/documentation I have personally evaluated this patient and have completed at least one if not all key elements of the E/M (history, physical exam, and MDM). Additional findings are as noted.    PtName: Alpa Mcneal  MRN: [id number]  Birthdate 2000  Date of evaluation: 1/30/25  Note Started: 11:38 AM EST    Primary Care Physician: No primary care provider on file.    Brief HPI:  24 old female presents emergency department syncopal episode.  The patient was visiting her family member in the ICU.  She reports that she came from [place] today and did not eat or drink prior to coming to the hospital.  She reported feeling very lightheaded then having near loss of consciousness.  The patient was called to the rapid response.    Pertinent Physical Exam Findings:  Vitals:    01/30/25 1142   BP: 111/82   Pulse: 80   Resp: 10   Temp: 98.2 °F (36.8 °C)   SpO2: 100%   Appears well, resting comfortably, no acute distress, regular rate and rhythm, lungs are clear to auscultation    Medical Decision Making: Patient is a 24 y.o. female presenting to the emergency department with near syncope. The chart was reviewed for pertinent history relating to the chief complaint.  The patient appears well at this time, no acute distress, vitals are stable.  Twelve-lead EKG reveals normal sinus rhythm, no specific 
diaphoretic.   HENT:      Head: Normocephalic and atraumatic.      Nose: Nose normal.   Eyes:      Pupils: Pupils are equal, round, and reactive to light.   Cardiovascular:      Rate and Rhythm: Normal rate and regular rhythm.      Pulses:           Radial pulses are 2+ on the right side and 2+ on the left side.        Dorsalis pedis pulses are 2+ on the right side and 2+ on the left side.      Heart sounds: Normal heart sounds.      Comments: Patient with adequate palpable pulse as well as capillary refill to bilateral upper and lower distal extremities.  Pulmonary:      Effort: Pulmonary effort is normal. No respiratory distress.      Breath sounds: Normal breath sounds.   Abdominal:      Palpations: Abdomen is soft.      Tenderness: There is no abdominal tenderness. There is no right CVA tenderness or left CVA tenderness.   Musculoskeletal:         General: No tenderness. Normal range of motion.      Cervical back: No rigidity.      Comments: Patient with adequate bilateral upper and lower extremity motor function without acute deficits or deviation from patient's baseline.   Skin:     General: Skin is warm and dry.      Capillary Refill: Capillary refill takes less than 2 seconds.      Findings: No rash.   Neurological:      General: No focal deficit present.      Mental Status: She is alert and oriented to person, place, and time.      Comments: Patient with adequate motor, and sensation to bilateral upper and lower distal extremities without acute deficits or deviation from patient's baseline.           DDX/DIAGNOSTIC RESULTS / EMERGENCY DEPARTMENT COURSE / MDM     Medical Decision Making  This is a 24-year-old female presenting with syncope but is likely vasovagal in nature.  Will obtain basic labs, pregnancy test.    Amount and/or Complexity of Data Reviewed  Labs: ordered. Decision-making details documented in ED Course.  ECG/medicine tests: ordered.    Risk  Prescription drug management.        EKG  EKG

## 2025-01-31 LAB
EKG ATRIAL RATE: 81 BPM
EKG P AXIS: 72 DEGREES
EKG P-R INTERVAL: 152 MS
EKG Q-T INTERVAL: 348 MS
EKG QRS DURATION: 72 MS
EKG QTC CALCULATION (BAZETT): 404 MS
EKG R AXIS: 75 DEGREES
EKG T AXIS: 48 DEGREES
EKG VENTRICULAR RATE: 81 BPM